# Patient Record
Sex: MALE | Race: WHITE | Employment: FULL TIME | URBAN - METROPOLITAN AREA
[De-identification: names, ages, dates, MRNs, and addresses within clinical notes are randomized per-mention and may not be internally consistent; named-entity substitution may affect disease eponyms.]

---

## 2017-02-23 DIAGNOSIS — F41.8 DEPRESSION WITH ANXIETY: ICD-10-CM

## 2017-02-27 RX ORDER — DESVENLAFAXINE SUCCINATE 50 MG/1
TABLET, EXTENDED RELEASE ORAL
Qty: 30 TABLET | Refills: 2 | Status: SHIPPED | OUTPATIENT
Start: 2017-02-27 | End: 2017-04-21 | Stop reason: SDUPTHER

## 2017-02-28 DIAGNOSIS — F41.8 DEPRESSION WITH ANXIETY: ICD-10-CM

## 2017-03-01 RX ORDER — DESVENLAFAXINE 50 MG/1
50 TABLET, EXTENDED RELEASE ORAL DAILY
Qty: 30 TABLET | Refills: 3 | Status: SHIPPED | OUTPATIENT
Start: 2017-03-01 | End: 2017-04-21

## 2017-04-21 ENCOUNTER — OFFICE VISIT (OUTPATIENT)
Dept: FAMILY MEDICINE CLINIC | Age: 47
End: 2017-04-21

## 2017-04-21 DIAGNOSIS — F41.8 DEPRESSION WITH ANXIETY: Primary | ICD-10-CM

## 2017-04-21 DIAGNOSIS — F41.0 PANIC ATTACK: ICD-10-CM

## 2017-04-21 DIAGNOSIS — M79.672 HEEL PAIN, CHRONIC, LEFT: ICD-10-CM

## 2017-04-21 DIAGNOSIS — I10 ESSENTIAL HYPERTENSION: ICD-10-CM

## 2017-04-21 DIAGNOSIS — N52.2 DRUG-INDUCED ERECTILE DYSFUNCTION: ICD-10-CM

## 2017-04-21 DIAGNOSIS — G89.29 HEEL PAIN, CHRONIC, LEFT: ICD-10-CM

## 2017-04-21 PROCEDURE — 99214 OFFICE O/P EST MOD 30 MIN: CPT | Performed by: FAMILY MEDICINE

## 2017-04-21 RX ORDER — DESVENLAFAXINE 50 MG/1
TABLET, EXTENDED RELEASE ORAL
Qty: 30 TABLET | Refills: 5 | Status: SHIPPED | OUTPATIENT
Start: 2017-04-21 | End: 2018-03-26 | Stop reason: DRUGHIGH

## 2017-04-21 RX ORDER — LISINOPRIL 10 MG/1
10 TABLET ORAL DAILY
Qty: 30 TABLET | Refills: 5 | Status: SHIPPED | OUTPATIENT
Start: 2017-04-21 | End: 2017-05-19 | Stop reason: SDUPTHER

## 2017-04-21 RX ORDER — LORAZEPAM 0.5 MG/1
0.5 TABLET ORAL EVERY 8 HOURS PRN
Qty: 30 TABLET | Refills: 0 | Status: CANCELLED | OUTPATIENT
Start: 2017-04-21

## 2017-04-22 VITALS
BODY MASS INDEX: 26.36 KG/M2 | WEIGHT: 199.8 LBS | TEMPERATURE: 97 F | SYSTOLIC BLOOD PRESSURE: 132 MMHG | HEART RATE: 80 BPM | DIASTOLIC BLOOD PRESSURE: 84 MMHG | RESPIRATION RATE: 18 BRPM

## 2017-05-19 DIAGNOSIS — I10 ESSENTIAL HYPERTENSION: ICD-10-CM

## 2017-05-19 RX ORDER — LISINOPRIL 10 MG/1
10 TABLET ORAL DAILY
Qty: 30 TABLET | Refills: 4 | Status: SHIPPED | OUTPATIENT
Start: 2017-05-19 | End: 2017-11-29 | Stop reason: SDUPTHER

## 2017-11-29 DIAGNOSIS — I10 ESSENTIAL HYPERTENSION: ICD-10-CM

## 2017-11-29 RX ORDER — LISINOPRIL 10 MG/1
10 TABLET ORAL DAILY
Qty: 30 TABLET | Refills: 2 | Status: SHIPPED | OUTPATIENT
Start: 2017-11-29 | End: 2018-04-30 | Stop reason: SDUPTHER

## 2017-12-07 ENCOUNTER — OFFICE VISIT (OUTPATIENT)
Dept: FAMILY MEDICINE CLINIC | Age: 47
End: 2017-12-07

## 2017-12-07 VITALS
RESPIRATION RATE: 20 BRPM | DIASTOLIC BLOOD PRESSURE: 82 MMHG | BODY MASS INDEX: 26.65 KG/M2 | OXYGEN SATURATION: 95 % | SYSTOLIC BLOOD PRESSURE: 130 MMHG | TEMPERATURE: 95.7 F | WEIGHT: 202 LBS | HEART RATE: 94 BPM

## 2017-12-07 DIAGNOSIS — I10 ESSENTIAL HYPERTENSION: ICD-10-CM

## 2017-12-07 DIAGNOSIS — R06.83 SNORING: ICD-10-CM

## 2017-12-07 DIAGNOSIS — N52.2 DRUG-INDUCED ERECTILE DYSFUNCTION: ICD-10-CM

## 2017-12-07 DIAGNOSIS — F41.0 PANIC ATTACK: ICD-10-CM

## 2017-12-07 DIAGNOSIS — F41.8 DEPRESSION WITH ANXIETY: Primary | ICD-10-CM

## 2017-12-07 LAB
A/G RATIO: 2 (ref 1.1–2.2)
ALBUMIN SERPL-MCNC: 4.9 G/DL (ref 3.4–5)
ALP BLD-CCNC: 68 U/L (ref 40–129)
ALT SERPL-CCNC: 32 U/L (ref 10–40)
ANION GAP SERPL CALCULATED.3IONS-SCNC: 12 MMOL/L (ref 3–16)
AST SERPL-CCNC: 26 U/L (ref 15–37)
BILIRUB SERPL-MCNC: 0.4 MG/DL (ref 0–1)
BUN BLDV-MCNC: 13 MG/DL (ref 7–20)
CALCIUM SERPL-MCNC: 9.4 MG/DL (ref 8.3–10.6)
CHLORIDE BLD-SCNC: 97 MMOL/L (ref 99–110)
CHOLESTEROL, TOTAL: 223 MG/DL (ref 0–199)
CO2: 28 MMOL/L (ref 21–32)
CREAT SERPL-MCNC: 0.9 MG/DL (ref 0.9–1.3)
GFR AFRICAN AMERICAN: >60
GFR NON-AFRICAN AMERICAN: >60
GLOBULIN: 2.5 G/DL
GLUCOSE BLD-MCNC: 99 MG/DL (ref 70–99)
HCT VFR BLD CALC: 45.1 % (ref 40.5–52.5)
HDLC SERPL-MCNC: 33 MG/DL (ref 40–60)
HEMOGLOBIN: 15.6 G/DL (ref 13.5–17.5)
LDL CHOLESTEROL CALCULATED: ABNORMAL MG/DL
MCH RBC QN AUTO: 32.9 PG (ref 26–34)
MCHC RBC AUTO-ENTMCNC: 34.5 G/DL (ref 31–36)
MCV RBC AUTO: 95.4 FL (ref 80–100)
PDW BLD-RTO: 13 % (ref 12.4–15.4)
PLATELET # BLD: 160 K/UL (ref 135–450)
PMV BLD AUTO: 11.5 FL (ref 5–10.5)
POTASSIUM SERPL-SCNC: 4.4 MMOL/L (ref 3.5–5.1)
RBC # BLD: 4.73 M/UL (ref 4.2–5.9)
SODIUM BLD-SCNC: 137 MMOL/L (ref 136–145)
TOTAL PROTEIN: 7.4 G/DL (ref 6.4–8.2)
TRIGL SERPL-MCNC: 458 MG/DL (ref 0–150)
VLDLC SERPL CALC-MCNC: ABNORMAL MG/DL
WBC # BLD: 6.2 K/UL (ref 4–11)

## 2017-12-07 PROCEDURE — G8419 CALC BMI OUT NRM PARAM NOF/U: HCPCS | Performed by: FAMILY MEDICINE

## 2017-12-07 PROCEDURE — 1036F TOBACCO NON-USER: CPT | Performed by: FAMILY MEDICINE

## 2017-12-07 PROCEDURE — 99214 OFFICE O/P EST MOD 30 MIN: CPT | Performed by: FAMILY MEDICINE

## 2017-12-07 PROCEDURE — G8427 DOCREV CUR MEDS BY ELIG CLIN: HCPCS | Performed by: FAMILY MEDICINE

## 2017-12-07 PROCEDURE — G8484 FLU IMMUNIZE NO ADMIN: HCPCS | Performed by: FAMILY MEDICINE

## 2017-12-07 RX ORDER — SILDENAFIL CITRATE 20 MG/1
20-40 TABLET ORAL DAILY PRN
Qty: 60 TABLET | Refills: 5 | Status: ON HOLD | OUTPATIENT
Start: 2017-12-07 | End: 2018-08-14 | Stop reason: HOSPADM

## 2017-12-07 RX ORDER — DESVENLAFAXINE 25 MG/1
25 TABLET, EXTENDED RELEASE ORAL DAILY
Qty: 30 TABLET | Refills: 5 | Status: SHIPPED | OUTPATIENT
Start: 2017-12-07 | End: 2018-07-19 | Stop reason: SDUPTHER

## 2017-12-07 RX ORDER — LORAZEPAM 0.5 MG/1
0.5 TABLET ORAL EVERY 8 HOURS PRN
Qty: 30 TABLET | Refills: 2 | Status: SHIPPED | OUTPATIENT
Start: 2017-12-07 | End: 2018-09-26 | Stop reason: SDUPTHER

## 2017-12-08 LAB
LDL CHOLESTEROL DIRECT: 126 MG/DL
TSH SERPL DL<=0.05 MIU/L-ACNC: 2.18 UIU/ML (ref 0.27–4.2)

## 2017-12-11 DIAGNOSIS — E78.1 HYPERTRIGLYCERIDEMIA: Primary | ICD-10-CM

## 2017-12-11 RX ORDER — FENOFIBRATE 160 MG/1
160 TABLET ORAL DAILY
Qty: 30 TABLET | Refills: 5 | Status: SHIPPED | OUTPATIENT
Start: 2017-12-11 | End: 2018-08-16

## 2018-03-26 ENCOUNTER — TELEPHONE (OUTPATIENT)
Dept: SLEEP MEDICINE | Age: 48
End: 2018-03-26

## 2018-03-26 ENCOUNTER — OFFICE VISIT (OUTPATIENT)
Dept: SLEEP MEDICINE | Age: 48
End: 2018-03-26

## 2018-03-26 VITALS
HEART RATE: 83 BPM | HEIGHT: 73 IN | BODY MASS INDEX: 26.64 KG/M2 | OXYGEN SATURATION: 96 % | WEIGHT: 201 LBS | DIASTOLIC BLOOD PRESSURE: 80 MMHG | SYSTOLIC BLOOD PRESSURE: 132 MMHG

## 2018-03-26 DIAGNOSIS — F41.8 DEPRESSION WITH ANXIETY: Chronic | ICD-10-CM

## 2018-03-26 DIAGNOSIS — I10 ESSENTIAL HYPERTENSION: Chronic | ICD-10-CM

## 2018-03-26 DIAGNOSIS — G47.10 HYPERSOMNIA: Primary | ICD-10-CM

## 2018-03-26 DIAGNOSIS — R06.83 SNORING: ICD-10-CM

## 2018-03-26 DIAGNOSIS — J30.89 CHRONIC ALLERGIC RHINITIS DUE TO OTHER ALLERGIC TRIGGER, UNSPECIFIED SEASONALITY: Chronic | ICD-10-CM

## 2018-03-26 PROBLEM — J31.0 CHRONIC RHINITIS: Status: ACTIVE | Noted: 2018-03-26

## 2018-03-26 PROCEDURE — G8427 DOCREV CUR MEDS BY ELIG CLIN: HCPCS | Performed by: INTERNAL MEDICINE

## 2018-03-26 PROCEDURE — 99244 OFF/OP CNSLTJ NEW/EST MOD 40: CPT | Performed by: INTERNAL MEDICINE

## 2018-03-26 PROCEDURE — G8484 FLU IMMUNIZE NO ADMIN: HCPCS | Performed by: INTERNAL MEDICINE

## 2018-03-26 PROCEDURE — G8419 CALC BMI OUT NRM PARAM NOF/U: HCPCS | Performed by: INTERNAL MEDICINE

## 2018-03-26 ASSESSMENT — SLEEP AND FATIGUE QUESTIONNAIRES
HOW LIKELY ARE YOU TO NOD OFF OR FALL ASLEEP WHEN YOU ARE A PASSENGER IN A CAR FOR AN HOUR WITHOUT A BREAK: 1
NECK CIRCUMFERENCE (INCHES): 15
HOW LIKELY ARE YOU TO NOD OFF OR FALL ASLEEP WHILE SITTING AND READING: 2
HOW LIKELY ARE YOU TO NOD OFF OR FALL ASLEEP WHILE SITTING QUIETLY AFTER LUNCH WITHOUT ALCOHOL: 1
ESS TOTAL SCORE: 7
HOW LIKELY ARE YOU TO NOD OFF OR FALL ASLEEP WHILE SITTING INACTIVE IN A PUBLIC PLACE: 0
HOW LIKELY ARE YOU TO NOD OFF OR FALL ASLEEP IN A CAR, WHILE STOPPED FOR A FEW MINUTES IN TRAFFIC: 0
HOW LIKELY ARE YOU TO NOD OFF OR FALL ASLEEP WHILE SITTING AND TALKING TO SOMEONE: 0
HOW LIKELY ARE YOU TO NOD OFF OR FALL ASLEEP WHILE LYING DOWN TO REST IN THE AFTERNOON WHEN CIRCUMSTANCES PERMIT: 2
HOW LIKELY ARE YOU TO NOD OFF OR FALL ASLEEP WHILE WATCHING TV: 1

## 2018-03-26 ASSESSMENT — ENCOUNTER SYMPTOMS
PHOTOPHOBIA: 0
VOMITING: 0
CHOKING: 0
EYE PAIN: 0
CHEST TIGHTNESS: 0
ABDOMINAL DISTENTION: 0
NAUSEA: 0
ALLERGIC/IMMUNOLOGIC NEGATIVE: 1
ABDOMINAL PAIN: 0
APNEA: 1
SHORTNESS OF BREATH: 0
RHINORRHEA: 0

## 2018-03-26 NOTE — LETTER
[facility]  [address]  [facility] [address]  Phone: [phone]  Fax: [phone]      March 26, 2018       Patient: Krzysztof Verdugo   MR Number: [id number]   YOB: 1970   Date of Visit: 3/26/2018     Thank you for allowing me to participate in the care of Krzysztof Verdugo. Here is my assessment and plan. Also attached is a copy of his consult note:    ASSESSMENT:  HARLEY was seen today for snoring and fatigue. Diagnoses and all orders for this visit:    Hypersomnia  Comments:  needs w/u  Orders:  -     Baseline Diagnostic Sleep Study; Future    Snoring  Comments:  needs w/u  Orders:  -     Baseline Diagnostic Sleep Study; Future    Essential hypertension    Depression with anxiety    Chronic allergic rhinitis due to other allergic trigger, unspecified seasonality        Plan:     Differential diagnosis includes but not limited to: BUSHRA, PLMD's, narcolepsy, parasomnias. Reviewed BUSHRA (which is the highest likelihood diagnosis): pathophysiology, diagnosis, complications and treatment. Instructed him not to drive if drowsy. Continue medications per his PCP and other physicians. Limit caffeine use after 3pm. Will do PSG to rule-out BUSHRA and other sleep disorders. 1 wk follow up after study to review his results. The chronic medical conditions listed are directly related to the primary diagnosis listed above. The management of the primary diagnosis affects the secondary diagnosis and vice versa. Cont meds for HTN, depression and rhinitis. Orders Placed This Encounter   Procedures    Baseline Diagnostic Sleep Study         If you have questions or concerns, please do not hesitate to call me. I look forward to following HARLEY along with you.     Sincerely,      Monique Domínguez MD    CC providers:  Chai Sweeney MD  [address] [address] [facility] In [facility] [address]

## 2018-04-30 DIAGNOSIS — I10 ESSENTIAL HYPERTENSION: ICD-10-CM

## 2018-04-30 RX ORDER — LISINOPRIL 10 MG/1
10 TABLET ORAL DAILY
Qty: 30 TABLET | Refills: 5 | Status: SHIPPED | OUTPATIENT
Start: 2018-04-30 | End: 2019-03-13 | Stop reason: SDUPTHER

## 2018-05-01 ENCOUNTER — HOSPITAL ENCOUNTER (OUTPATIENT)
Dept: SLEEP MEDICINE | Age: 48
Discharge: HOME | End: 2018-05-02
Attending: FAMILY MEDICINE | Admitting: FAMILY MEDICINE

## 2018-05-01 DIAGNOSIS — G47.10 HYPERSOMNIA: ICD-10-CM

## 2018-05-01 DIAGNOSIS — G47.33 OSA (OBSTRUCTIVE SLEEP APNEA): ICD-10-CM

## 2018-05-01 DIAGNOSIS — R06.83 SNORING: ICD-10-CM

## 2018-05-01 PROCEDURE — 95811 POLYSOM 6/>YRS CPAP 4/> PARM: CPT | Performed by: INTERNAL MEDICINE

## 2018-05-02 DIAGNOSIS — G47.33 OSA (OBSTRUCTIVE SLEEP APNEA): Primary | ICD-10-CM

## 2018-05-08 ENCOUNTER — TELEPHONE (OUTPATIENT)
Dept: SLEEP MEDICINE | Age: 48
End: 2018-05-08

## 2018-06-24 DIAGNOSIS — I10 ESSENTIAL HYPERTENSION: ICD-10-CM

## 2018-06-25 RX ORDER — LISINOPRIL 10 MG/1
10 TABLET ORAL DAILY
Qty: 30 TABLET | Refills: 0 | Status: SHIPPED | OUTPATIENT
Start: 2018-06-25 | End: 2018-07-19 | Stop reason: SDUPTHER

## 2018-07-16 DIAGNOSIS — I10 ESSENTIAL HYPERTENSION: ICD-10-CM

## 2018-07-16 RX ORDER — DESVENLAFAXINE 25 MG/1
25 TABLET, EXTENDED RELEASE ORAL DAILY
Qty: 30 TABLET | Refills: 5 | OUTPATIENT
Start: 2018-07-16

## 2018-07-16 RX ORDER — LISINOPRIL 10 MG/1
10 TABLET ORAL DAILY
Qty: 30 TABLET | Refills: 0 | OUTPATIENT
Start: 2018-07-16

## 2018-07-16 NOTE — TELEPHONE ENCOUNTER
From: Lucero Blair  Sent: 7/15/2018 7:37 PM EDT  Subject: Medication Renewal Request    Prosper Fitzgerald would like a refill of the following medications:     lisinopril (PRINIVIL;ZESTRIL) 10 MG tablet Vaishali Stevenson MD]    Preferred pharmacy: [facility] [address] [facility] [address] [facility] [phone] - F [phone]        Medication renewals requested in this message routed separately:     desvenlafaxine succinate (PRISTIQ) 25 MG TB24 extended release tablet Alysa Crum MD]

## 2018-07-17 ENCOUNTER — OFFICE VISIT (OUTPATIENT)
Dept: SLEEP MEDICINE | Age: 48
End: 2018-07-17

## 2018-07-17 VITALS
OXYGEN SATURATION: 97 % | WEIGHT: 203 LBS | SYSTOLIC BLOOD PRESSURE: 118 MMHG | BODY MASS INDEX: 26.9 KG/M2 | DIASTOLIC BLOOD PRESSURE: 87 MMHG | HEART RATE: 77 BPM | HEIGHT: 73 IN

## 2018-07-17 DIAGNOSIS — I10 ESSENTIAL HYPERTENSION: Chronic | ICD-10-CM

## 2018-07-17 DIAGNOSIS — F41.8 DEPRESSION WITH ANXIETY: Chronic | ICD-10-CM

## 2018-07-17 DIAGNOSIS — J30.89 CHRONIC ALLERGIC RHINITIS DUE TO OTHER ALLERGIC TRIGGER, UNSPECIFIED SEASONALITY: Chronic | ICD-10-CM

## 2018-07-17 DIAGNOSIS — G47.33 OBSTRUCTIVE SLEEP APNEA SYNDROME: ICD-10-CM

## 2018-07-17 PROBLEM — J31.0 CHRONIC RHINITIS: Chronic | Status: ACTIVE | Noted: 2018-03-26

## 2018-07-17 PROCEDURE — 99214 OFFICE O/P EST MOD 30 MIN: CPT | Performed by: INTERNAL MEDICINE

## 2018-07-17 PROCEDURE — 1036F TOBACCO NON-USER: CPT | Performed by: INTERNAL MEDICINE

## 2018-07-17 PROCEDURE — G8419 CALC BMI OUT NRM PARAM NOF/U: HCPCS | Performed by: INTERNAL MEDICINE

## 2018-07-17 PROCEDURE — G8427 DOCREV CUR MEDS BY ELIG CLIN: HCPCS | Performed by: INTERNAL MEDICINE

## 2018-07-17 ASSESSMENT — SLEEP AND FATIGUE QUESTIONNAIRES
HOW LIKELY ARE YOU TO NOD OFF OR FALL ASLEEP WHILE SITTING AND TALKING TO SOMEONE: 0
HOW LIKELY ARE YOU TO NOD OFF OR FALL ASLEEP IN A CAR, WHILE STOPPED FOR A FEW MINUTES IN TRAFFIC: 0
HOW LIKELY ARE YOU TO NOD OFF OR FALL ASLEEP WHILE SITTING QUIETLY AFTER LUNCH WITHOUT ALCOHOL: 0
ESS TOTAL SCORE: 4
HOW LIKELY ARE YOU TO NOD OFF OR FALL ASLEEP WHILE WATCHING TV: 1
HOW LIKELY ARE YOU TO NOD OFF OR FALL ASLEEP WHILE SITTING INACTIVE IN A PUBLIC PLACE: 0
HOW LIKELY ARE YOU TO NOD OFF OR FALL ASLEEP WHILE SITTING AND READING: 0
HOW LIKELY ARE YOU TO NOD OFF OR FALL ASLEEP WHEN YOU ARE A PASSENGER IN A CAR FOR AN HOUR WITHOUT A BREAK: 1
HOW LIKELY ARE YOU TO NOD OFF OR FALL ASLEEP WHILE LYING DOWN TO REST IN THE AFTERNOON WHEN CIRCUMSTANCES PERMIT: 2

## 2018-07-17 ASSESSMENT — ENCOUNTER SYMPTOMS
CHEST TIGHTNESS: 0
SINUS PRESSURE: 0
PHOTOPHOBIA: 0
EYE PAIN: 0
STRIDOR: 0
SHORTNESS OF BREATH: 0

## 2018-07-17 NOTE — PROGRESS NOTES
LORazepam (ATIVAN) 0.5 MG tablet Take 1 tablet by mouth every 8 hours as needed for Anxiety . 30 tablet 2    desvenlafaxine succinate (PRISTIQ) 25 MG TB24 extended release tablet Take 1 tablet by mouth daily 30 tablet 5    sildenafil (REVATIO) 20 MG tablet Take 1-2 tablets by mouth daily as needed (erectile dysfunction) 60 tablet 5    fluticasone (FLONASE) 50 MCG/ACT nasal spray 1 spray by Nasal route daily. 1 Bottle 5     No current facility-administered medications for this visit. Allergies as of 07/17/2018 - Review Complete 07/17/2018   Allergen Reaction Noted    No known drug allergy  02/24/2011       Patient Active Problem List   Diagnosis    Umbilical hernia    Depression with anxiety    Essential hypertension    Chronic rhinitis    Obstructive sleep apnea syndrome       Past Medical History:   Diagnosis Date    Chronic rhinitis 3/26/2018    Depression with anxiety 10/3/2014    Essential hypertension 9/23/2016    Obstructive sleep apnea syndrome     Umbilical hernia        Past Surgical History:   Procedure Laterality Date    INGUINAL HERNIA REPAIR      UMBILICAL HERNIA REPAIR         Family History   Problem Relation Age of Onset    Hypertension Other        Review of Systems   Constitutional: Negative. HENT: Negative for dental problem, ear pain, nosebleeds, sinus pressure and sneezing. Eyes: Negative for photophobia, pain and visual disturbance. Respiratory: Negative for chest tightness, shortness of breath and stridor. Cardiovascular: Negative for chest pain, palpitations and leg swelling. Musculoskeletal: Negative for neck pain and neck stiffness. Vitals:  Weight BMI   Wt Readings from Last 3 Encounters:   07/17/18 203 lb (92.1 kg)   03/26/18 201 lb (91.2 kg)   12/07/17 202 lb (91.6 kg)    Body mass index is 26.78 kg/m².      BP HR SaO2   BP Readings from Last 3 Encounters:   07/17/18 118/87   03/26/18 132/80   12/07/17 130/82    Pulse Readings from Last 3

## 2018-07-17 NOTE — LETTER
[facility]  [address] [facility] [address]  Phone: [phone]  Fax: [phone]    July 17, 2018       Patient: Chaya Madrid   MR Number: [id number]   YOB: 1970   Date of Visit: 7/17/2018       Chaya Madrid was seen for a follow up visit today. Here is my assessment and plan as well as an attached copy of his visit today:    Obstructive sleep apnea syndrome  New Problem - On Tx. Reviewed sleep study (copy given to pt for their records) and download compliance data with patient. Supplies and parts as needed for his machine. These are medically necessary. Limit caffeine use after 3pm.  Discussed CPAP pillows and possible mask liners. Essential hypertension  Chronic- Stable. Cont meds per PCP and other physicians. Depression with anxiety  Chronic- Stable. Cont meds per PCP and other physicians. Chronic rhinitis  Chronic- Stable. Cont meds per PCP and other physicians. If you have questions or concerns, please do not hesitate to call me. I look forward to following Jesica Jeter along with you.     Sincerely,    Aspen Valadez MD    CC providers:  Mariluz Maria MD  [address] [place] [id number] [address] [place] [address] In [place]

## 2018-07-17 NOTE — ASSESSMENT & PLAN NOTE
New Problem - On Tx. Reviewed sleep study (copy given to pt for their records) and download compliance data with patient. Supplies and parts as needed for his machine. These are medically necessary. Limit caffeine use after 3pm.  Discussed CPAP pillows and possible mask liners.

## 2018-07-19 DIAGNOSIS — I10 ESSENTIAL HYPERTENSION: ICD-10-CM

## 2018-07-19 RX ORDER — LISINOPRIL 10 MG/1
10 TABLET ORAL DAILY
Qty: 30 TABLET | Refills: 5 | Status: SHIPPED | OUTPATIENT
Start: 2018-07-19 | End: 2019-02-05 | Stop reason: SDUPTHER

## 2018-07-19 RX ORDER — DESVENLAFAXINE 25 MG/1
25 TABLET, EXTENDED RELEASE ORAL DAILY
Qty: 30 TABLET | Refills: 5 | Status: SHIPPED | OUTPATIENT
Start: 2018-07-19 | End: 2019-01-28 | Stop reason: SDUPTHER

## 2018-07-19 NOTE — TELEPHONE ENCOUNTER
Last OV 12/07/2017  No f/u appointment scheduled at this time. Lisinopril was sent to Pharmacy on 4/30/18 #30 with 5 refills, and again on 6/25/18 #30 with 0 refills. Pristiq 25 mg sent on 12/7/17 # 30 with 5 refills. New rx due at this time. Both Rx pending to requested pharmacy. Please advise.

## 2018-08-10 ENCOUNTER — OFFICE VISIT (OUTPATIENT)
Dept: FAMILY MEDICINE CLINIC | Age: 48
End: 2018-08-10

## 2018-08-10 ENCOUNTER — TELEPHONE (OUTPATIENT)
Dept: FAMILY MEDICINE CLINIC | Age: 48
End: 2018-08-10

## 2018-08-10 VITALS
HEART RATE: 82 BPM | RESPIRATION RATE: 12 BRPM | TEMPERATURE: 97.1 F | WEIGHT: 205 LBS | OXYGEN SATURATION: 97 % | BODY MASS INDEX: 27.05 KG/M2 | DIASTOLIC BLOOD PRESSURE: 85 MMHG | SYSTOLIC BLOOD PRESSURE: 133 MMHG

## 2018-08-10 DIAGNOSIS — R35.0 URINARY FREQUENCY: ICD-10-CM

## 2018-08-10 DIAGNOSIS — N41.0 ACUTE PROSTATITIS: ICD-10-CM

## 2018-08-10 DIAGNOSIS — R07.89 CHEST PRESSURE: Primary | ICD-10-CM

## 2018-08-10 DIAGNOSIS — I10 ESSENTIAL HYPERTENSION: Chronic | ICD-10-CM

## 2018-08-10 DIAGNOSIS — E78.1 HYPERTRIGLYCERIDEMIA: ICD-10-CM

## 2018-08-10 LAB
BILIRUBIN, POC: NEGATIVE
BLOOD URINE, POC: 6.5
CLARITY, POC: CLEAR
COLOR, POC: YELLOW
GLUCOSE URINE, POC: NEGATIVE
KETONES, POC: NEGATIVE
LEUKOCYTE EST, POC: NEGATIVE
NITRITE, POC: NEGATIVE
PH, POC: NEGATIVE
PROTEIN, POC: NEGATIVE
SPECIFIC GRAVITY, POC: 1010
UROBILINOGEN, POC: 0.2

## 2018-08-10 PROCEDURE — 81002 URINALYSIS NONAUTO W/O SCOPE: CPT | Performed by: FAMILY MEDICINE

## 2018-08-10 PROCEDURE — G8419 CALC BMI OUT NRM PARAM NOF/U: HCPCS | Performed by: FAMILY MEDICINE

## 2018-08-10 PROCEDURE — 1036F TOBACCO NON-USER: CPT | Performed by: FAMILY MEDICINE

## 2018-08-10 PROCEDURE — 93000 ELECTROCARDIOGRAM COMPLETE: CPT | Performed by: FAMILY MEDICINE

## 2018-08-10 PROCEDURE — 99214 OFFICE O/P EST MOD 30 MIN: CPT | Performed by: FAMILY MEDICINE

## 2018-08-10 PROCEDURE — G8427 DOCREV CUR MEDS BY ELIG CLIN: HCPCS | Performed by: FAMILY MEDICINE

## 2018-08-10 RX ORDER — CIPROFLOXACIN 500 MG/1
500 TABLET, FILM COATED ORAL 2 TIMES DAILY
Qty: 20 TABLET | Refills: 0 | Status: ON HOLD | OUTPATIENT
Start: 2018-08-10 | End: 2018-08-14 | Stop reason: HOSPADM

## 2018-08-10 ASSESSMENT — PATIENT HEALTH QUESTIONNAIRE - PHQ9
2. FEELING DOWN, DEPRESSED OR HOPELESS: 1
1. LITTLE INTEREST OR PLEASURE IN DOING THINGS: 0
SUM OF ALL RESPONSES TO PHQ9 QUESTIONS 1 & 2: 1
SUM OF ALL RESPONSES TO PHQ QUESTIONS 1-9: 1
SUM OF ALL RESPONSES TO PHQ QUESTIONS 1-9: 1

## 2018-08-10 NOTE — PROGRESS NOTES
Pt states that he was walking to a concert and felt some cramping, chest pressure issues. Pt took a deep breath and sx did improve. About 2 wks ago, while cutting the grass, he felt similar sx. Occurred again with cutting grass. Pt is finding that with any physical activity, gets a cramping/indigestion and does feel that he gets sx referred into neck and back of throat. Sx only with exertion, never at rest and present with exertion only. Pt does have HTN, and does have some family hx of coronary artery disease. Pt states it feels like a pressure, that is present only when he is exerting himself. Pt not doing any formal exercise but has noted this with exertion. No syncope. No dizziness during episode. No shortness of breath at rest or with exertion. Pt has noted some issues with urinary sx, has noted that stream is not as strong as prior, and has been present for 2wks or so. Sx come and go. Does have some dribbling. Does have very mild hesitancy. No blood in urine, no burning but feels some sort of discomfort in the area. Sx present for about 2wks currently. No abd pain, no nausea/vomiting, no fever, chills. Pt here for follow up of blood pressure. Pt states doing great with adherence to therapy and feels well. No issues of chest pain, shortness of breath. No vision changes, headache, swelling in legs. Except as noted above in the history of present illness, the review of systems is  negative for headache, vision changes, abd pain, bowel changes. Past medical, surgical, and social history reviewed and updated  Medications and allergies reviewed and updated         O: /85   Pulse 82   Temp 97.1 °F (36.2 °C)   Resp 12   Wt 205 lb (93 kg)   SpO2 97%   BMI 27.05 kg/m²   GEN: No acute distress, cooperative, well nourished, alert. HEENT: PEERLA, EOMI , normocephalic/atraumatic, nares and oropharynx clear. Mucus membranes normal, Tympanic membranes clear bilaterally. today  Management pending results. To ER for increasing chest pain, angina sx as discussed  - EKG 12 Lead  - Stress test, myoview    2. Hypertriglyceridemia  Tolerating therapy, due for fu/ bloodwork but will hold off pending eval of chest pain     3. Essential hypertension  Stable @ goal, controlled  - Stress test, myoview    4. Urinary frequency  UA neg, exam and hx c/w acute prostatitis  tx as below  - POCT Urinalysis no Micro    5. Acute prostatitis  - ciprofloxacin (CIPRO) 500 MG tablet; Take 1 tablet by mouth 2 times daily for 10 days  Dispense: 20 tablet; Refill: 0           Follow-up appointment:   Pending stress test results    Discussed use, benefit, and side effects of all prescribed medications. Barriers to medication compliance addressed. All patient questions answered. Pt voiced understanding. When applicable, [facility]. The patient has signed appropriate paperworks/consents. Dragon dictation software was used for parts of this progress note. All attempts were made to correct any errors and ensure accuracy.

## 2018-08-11 ENCOUNTER — APPOINTMENT (OUTPATIENT)
Dept: GENERAL RADIOLOGY | Age: 48
DRG: 247 | End: 2018-08-11
Payer: COMMERCIAL

## 2018-08-11 ENCOUNTER — HOSPITAL ENCOUNTER (INPATIENT)
Age: 48
LOS: 1 days | Discharge: HOME | DRG: 247 | End: 2018-08-14
Attending: EMERGENCY MEDICINE | Admitting: INTERNAL MEDICINE
Payer: COMMERCIAL

## 2018-08-11 ENCOUNTER — HOSPITAL ENCOUNTER (OUTPATIENT)
Dept: NUCLEAR MEDICINE | Age: 48
Discharge: HOME | DRG: 247 | End: 2018-08-11
Payer: COMMERCIAL

## 2018-08-11 DIAGNOSIS — R94.39 POSITIVE CARDIAC STRESS TEST: ICD-10-CM

## 2018-08-11 DIAGNOSIS — R07.9 CHEST PAIN, UNSPECIFIED TYPE: Primary | ICD-10-CM

## 2018-08-11 DIAGNOSIS — R07.89 CHEST PRESSURE: ICD-10-CM

## 2018-08-11 DIAGNOSIS — I10 ESSENTIAL HYPERTENSION: ICD-10-CM

## 2018-08-11 PROBLEM — I20.8 ANGINA OF EFFORT (HCC): Status: ACTIVE | Noted: 2018-08-11

## 2018-08-11 PROBLEM — I20.89 ANGINA OF EFFORT: Status: ACTIVE | Noted: 2018-08-11

## 2018-08-11 PROBLEM — E78.1 HYPERTRIGLYCERIDEMIA: Chronic | Status: ACTIVE | Noted: 2018-08-10

## 2018-08-11 LAB
ANION GAP SERPL CALCULATED.3IONS-SCNC: 14 MMOL/L (ref 3–16)
BASOPHILS ABSOLUTE: 0.1 K/UL (ref 0–0.2)
BASOPHILS RELATIVE PERCENT: 0.7 %
BUN BLDV-MCNC: 14 MG/DL (ref 7–20)
CALCIUM SERPL-MCNC: 9.7 MG/DL (ref 8.3–10.6)
CHLORIDE BLD-SCNC: 101 MMOL/L (ref 99–110)
CO2: 22 MMOL/L (ref 21–32)
CREAT SERPL-MCNC: 0.9 MG/DL (ref 0.9–1.3)
EKG ATRIAL RATE: 102 BPM
EKG DIAGNOSIS: NORMAL
EKG P AXIS: 51 DEGREES
EKG P-R INTERVAL: 150 MS
EKG Q-T INTERVAL: 326 MS
EKG QRS DURATION: 88 MS
EKG QTC CALCULATION (BAZETT): 424 MS
EKG R AXIS: 37 DEGREES
EKG T AXIS: 43 DEGREES
EKG VENTRICULAR RATE: 102 BPM
EOSINOPHILS ABSOLUTE: 0 K/UL (ref 0–0.6)
EOSINOPHILS RELATIVE PERCENT: 0.5 %
GFR AFRICAN AMERICAN: >60
GFR NON-AFRICAN AMERICAN: >60
GLUCOSE BLD-MCNC: 98 MG/DL (ref 70–99)
HCT VFR BLD CALC: 46.6 % (ref 40.5–52.5)
HEMOGLOBIN: 16.2 G/DL (ref 13.5–17.5)
LV EF: 55 %
LV EF: 67 %
LVEF MODALITY: NORMAL
LVEF MODALITY: NORMAL
LYMPHOCYTES ABSOLUTE: 1.5 K/UL (ref 1–5.1)
LYMPHOCYTES RELATIVE PERCENT: 14.5 %
MCH RBC QN AUTO: 32.2 PG (ref 26–34)
MCHC RBC AUTO-ENTMCNC: 34.7 G/DL (ref 31–36)
MCV RBC AUTO: 92.8 FL (ref 80–100)
MONOCYTES ABSOLUTE: 0.6 K/UL (ref 0–1.3)
MONOCYTES RELATIVE PERCENT: 6.4 %
NEUTROPHILS ABSOLUTE: 7.8 K/UL (ref 1.7–7.7)
NEUTROPHILS RELATIVE PERCENT: 77.9 %
PDW BLD-RTO: 12.8 % (ref 12.4–15.4)
PLATELET # BLD: 148 K/UL (ref 135–450)
PMV BLD AUTO: 10.8 FL (ref 5–10.5)
POTASSIUM SERPL-SCNC: 4.6 MMOL/L (ref 3.5–5.1)
RBC # BLD: 5.02 M/UL (ref 4.2–5.9)
SODIUM BLD-SCNC: 137 MMOL/L (ref 136–145)
TROPONIN: 0.01 NG/ML
TROPONIN: <0.01 NG/ML
TROPONIN: <0.01 NG/ML
WBC # BLD: 10.1 K/UL (ref 4–11)

## 2018-08-11 PROCEDURE — 2580000003 HC RX 258: Performed by: INTERNAL MEDICINE

## 2018-08-11 PROCEDURE — 93017 CV STRESS TEST TRACING ONLY: CPT

## 2018-08-11 PROCEDURE — 3430000000 HC RX DIAGNOSTIC RADIOPHARMACEUTICAL: Performed by: FAMILY MEDICINE

## 2018-08-11 PROCEDURE — 6370000000 HC RX 637 (ALT 250 FOR IP): Performed by: INTERNAL MEDICINE

## 2018-08-11 PROCEDURE — 6370000000 HC RX 637 (ALT 250 FOR IP): Performed by: EMERGENCY MEDICINE

## 2018-08-11 PROCEDURE — 80048 BASIC METABOLIC PNL TOTAL CA: CPT

## 2018-08-11 PROCEDURE — 93306 TTE W/DOPPLER COMPLETE: CPT

## 2018-08-11 PROCEDURE — 93005 ELECTROCARDIOGRAM TRACING: CPT | Performed by: EMERGENCY MEDICINE

## 2018-08-11 PROCEDURE — 78452 HT MUSCLE IMAGE SPECT MULT: CPT

## 2018-08-11 PROCEDURE — G0378 HOSPITAL OBSERVATION PER HR: HCPCS

## 2018-08-11 PROCEDURE — 84484 ASSAY OF TROPONIN QUANT: CPT

## 2018-08-11 PROCEDURE — 99222 1ST HOSP IP/OBS MODERATE 55: CPT | Performed by: INTERNAL MEDICINE

## 2018-08-11 PROCEDURE — A9502 TC99M TETROFOSMIN: HCPCS | Performed by: FAMILY MEDICINE

## 2018-08-11 PROCEDURE — 2580000003 HC RX 258: Performed by: FAMILY MEDICINE

## 2018-08-11 PROCEDURE — 99284 EMERGENCY DEPT VISIT MOD MDM: CPT

## 2018-08-11 PROCEDURE — 85025 COMPLETE CBC W/AUTO DIFF WBC: CPT

## 2018-08-11 PROCEDURE — 71046 X-RAY EXAM CHEST 2 VIEWS: CPT

## 2018-08-11 PROCEDURE — 36415 COLL VENOUS BLD VENIPUNCTURE: CPT

## 2018-08-11 RX ORDER — ACETAMINOPHEN 325 MG/1
650 TABLET ORAL EVERY 4 HOURS PRN
Status: DISCONTINUED | OUTPATIENT
Start: 2018-08-11 | End: 2018-08-13 | Stop reason: SDUPTHER

## 2018-08-11 RX ORDER — DESVENLAFAXINE 25 MG/1
25 TABLET, EXTENDED RELEASE ORAL DAILY
Status: DISCONTINUED | OUTPATIENT
Start: 2018-08-11 | End: 2018-08-14 | Stop reason: HOSPADM

## 2018-08-11 RX ORDER — LISINOPRIL 10 MG/1
10 TABLET ORAL DAILY
Status: DISCONTINUED | OUTPATIENT
Start: 2018-08-11 | End: 2018-08-14 | Stop reason: HOSPADM

## 2018-08-11 RX ORDER — ASPIRIN 81 MG/1
81 TABLET, CHEWABLE ORAL DAILY
Status: DISCONTINUED | OUTPATIENT
Start: 2018-08-12 | End: 2018-08-14 | Stop reason: HOSPADM

## 2018-08-11 RX ORDER — NITROGLYCERIN 0.4 MG/1
0.4 TABLET SUBLINGUAL EVERY 5 MIN PRN
Status: DISCONTINUED | OUTPATIENT
Start: 2018-08-11 | End: 2018-08-14 | Stop reason: HOSPADM

## 2018-08-11 RX ORDER — 0.9 % SODIUM CHLORIDE 0.9 %
10 VIAL (ML) INJECTION PRN
Status: DISCONTINUED | OUTPATIENT
Start: 2018-08-11 | End: 2018-08-12 | Stop reason: HOSPADM

## 2018-08-11 RX ORDER — SODIUM CHLORIDE 0.9 % (FLUSH) 0.9 %
10 SYRINGE (ML) INJECTION EVERY 12 HOURS SCHEDULED
Status: DISCONTINUED | OUTPATIENT
Start: 2018-08-11 | End: 2018-08-14 | Stop reason: HOSPADM

## 2018-08-11 RX ORDER — ONDANSETRON 2 MG/ML
4 INJECTION INTRAMUSCULAR; INTRAVENOUS EVERY 6 HOURS PRN
Status: DISCONTINUED | OUTPATIENT
Start: 2018-08-11 | End: 2018-08-14 | Stop reason: HOSPADM

## 2018-08-11 RX ORDER — SODIUM CHLORIDE 0.9 % (FLUSH) 0.9 %
10 SYRINGE (ML) INJECTION PRN
Status: DISCONTINUED | OUTPATIENT
Start: 2018-08-11 | End: 2018-08-14 | Stop reason: HOSPADM

## 2018-08-11 RX ORDER — LORAZEPAM 0.5 MG/1
0.5 TABLET ORAL EVERY 8 HOURS PRN
Status: DISCONTINUED | OUTPATIENT
Start: 2018-08-11 | End: 2018-08-13

## 2018-08-11 RX ORDER — LISINOPRIL 10 MG/1
TABLET ORAL
Status: DISPENSED
Start: 2018-08-11 | End: 2018-08-12

## 2018-08-11 RX ORDER — CARVEDILOL 6.25 MG/1
3.12 TABLET ORAL 2 TIMES DAILY WITH MEALS
Status: DISCONTINUED | OUTPATIENT
Start: 2018-08-11 | End: 2018-08-14 | Stop reason: HOSPADM

## 2018-08-11 RX ORDER — ASPIRIN 81 MG/1
324 TABLET, CHEWABLE ORAL ONCE
Status: COMPLETED | OUTPATIENT
Start: 2018-08-11 | End: 2018-08-11

## 2018-08-11 RX ADMIN — LISINOPRIL 10 MG: 10 TABLET ORAL at 13:53

## 2018-08-11 RX ADMIN — SODIUM CHLORIDE 10 ML: 9 INJECTION, SOLUTION INTRAMUSCULAR; INTRAVENOUS; SUBCUTANEOUS at 09:56

## 2018-08-11 RX ADMIN — CARVEDILOL 3.12 MG: 6.25 TABLET, FILM COATED ORAL at 18:04

## 2018-08-11 RX ADMIN — DESVENLAFAXINE 25 MG: 25 TABLET, EXTENDED RELEASE ORAL at 18:05

## 2018-08-11 RX ADMIN — ASPIRIN 81 MG 324 MG: 81 TABLET ORAL at 13:13

## 2018-08-11 RX ADMIN — TETROFOSMIN 30 MILLICURIE: 1.38 INJECTION, POWDER, LYOPHILIZED, FOR SOLUTION INTRAVENOUS at 09:56

## 2018-08-11 RX ADMIN — ACETAMINOPHEN 650 MG: 325 TABLET ORAL at 18:07

## 2018-08-11 RX ADMIN — TETROFOSMIN 10 MILLICURIE: 1.38 INJECTION, POWDER, LYOPHILIZED, FOR SOLUTION INTRAVENOUS at 08:56

## 2018-08-11 RX ADMIN — SODIUM CHLORIDE 10 ML: 9 INJECTION, SOLUTION INTRAMUSCULAR; INTRAVENOUS; SUBCUTANEOUS at 08:56

## 2018-08-11 RX ADMIN — Medication 10 ML: at 22:16

## 2018-08-11 RX ADMIN — ACETAMINOPHEN 650 MG: 325 TABLET ORAL at 22:16

## 2018-08-11 ASSESSMENT — PAIN SCALES - GENERAL
PAINLEVEL_OUTOF10: 4
PAINLEVEL_OUTOF10: 0
PAINLEVEL_OUTOF10: 1
PAINLEVEL_OUTOF10: 0
PAINLEVEL_OUTOF10: 0
PAINLEVEL_OUTOF10: 6
PAINLEVEL_OUTOF10: 4

## 2018-08-11 ASSESSMENT — PAIN DESCRIPTION - DESCRIPTORS
DESCRIPTORS: HEADACHE
DESCRIPTORS: HEADACHE

## 2018-08-11 ASSESSMENT — PAIN DESCRIPTION - LOCATION
LOCATION: HEAD
LOCATION: HEAD
LOCATION: CHEST;JAW

## 2018-08-11 ASSESSMENT — PAIN DESCRIPTION - ORIENTATION: ORIENTATION: UPPER

## 2018-08-11 ASSESSMENT — PAIN DESCRIPTION - PAIN TYPE
TYPE: ACUTE PAIN

## 2018-08-11 ASSESSMENT — PAIN DESCRIPTION - FREQUENCY
FREQUENCY: INTERMITTENT
FREQUENCY: INTERMITTENT

## 2018-08-11 ASSESSMENT — ENCOUNTER SYMPTOMS
SHORTNESS OF BREATH: 0
BACK PAIN: 0
ABDOMINAL PAIN: 0

## 2018-08-11 ASSESSMENT — PAIN DESCRIPTION - ONSET
ONSET: ON-GOING
ONSET: ON-GOING

## 2018-08-11 ASSESSMENT — HEART SCORE: ECG: 1

## 2018-08-11 NOTE — CONSULTS
[facility] Cardiology   CONSULTATION  [phone]                                                                                            Pt Name: Estiven Garibay  Age: 53 y.o. Sex: male  : 1970  Location: [place] [address]    Referring Physician: Cailin Morris MD      Reason for Consult:       Reason for Consultation/Chief Complaint: Angina/+ Stress test      HPI:      Estiven Garibay is a 53 y.o. male with a past medical history of HTN, HLD  who presented to the hospital for outpatient stress test. Results are positive. Currently pt is asymptomatic but was complaining of progressive angina over the past few weeks. He denies irregular heart beat, lower extremity edema, near-syncope, orthopnea, palpitations, paroxysmal nocturnal dyspnea and syncope. Histories     Past Medical History:   has a past medical history of Chronic rhinitis; Depression with anxiety; Essential hypertension; Hypertriglyceridemia; Obstructive sleep apnea syndrome; and Umbilical hernia. Surgical History:   has a past surgical history that includes Inguinal hernia repair and Umbilical hernia repair. Social History:   reports that he has never smoked. He has never used smokeless tobacco. He reports that he drinks alcohol. Family History:  No evidence for sudden cardiac death or premature CAD      Medications:       Home Medications  Were reviewed and are listed in nursing record. and/or listed below  Prior to Admission medications    Medication Sig Start Date End Date Taking?  Authorizing Provider   ciprofloxacin (CIPRO) 500 MG tablet Take 1 tablet by mouth 2 times daily for 10 days 8/10/18 8/20/18 Yes Susanne Macario MD   desvenlafaxine succinate (PRISTIQ) 25 MG TB24 extended release tablet Take 1 tablet by mouth daily 18  Yes Susanne Macario MD   lisinopril (PRINIVIL;ZESTRIL) 10 MG tablet Take 1 tablet by mouth daily 18  Yes Susanne Macario MD   fenofibrate 160 MG tablet Take 1 tablet by mouth daily 17 Yes Crescencio Hinds MD   LORazepam (ATIVAN) 0.5 MG tablet Take 1 tablet by mouth every 8 hours as needed for Anxiety . 12/7/17  Yes Crescencio Hinds MD   sildenafil (REVATIO) 20 MG tablet Take 1-2 tablets by mouth daily as needed (erectile dysfunction) 12/7/17  Yes Crescencio Hinds MD   fluticasone (FLONASE) 50 MCG/ACT nasal spray 1 spray by Nasal route daily. 3/6/15 3/5/16  Crescencio Hinds MD          Inpatient Medications:      IV drips:      PRN:      Allergy:     No known drug allergy       Review of Systems:     All 12 point review of symptoms completed. Pertinent positives identified in the HPI, all other review of symptoms negative as below. CONSTITUTIONAL: Nounanticipated weight loss. No change in energy level, sleep pattern, or activity level. SKIN: No rash or pruritis. EYES: No visual changes or diplopia. No scleral icterus. ENT: No Headaches, hearing loss or vertigo. No mouth sores or sore throat. CARDIOVASCULAR: + chest pain/chest pressure/chest discomfort. No palpitations. RESPIRATORY: No cough or wheezing, no sputum production. No hematemesis. GASTROINTESTINAL: No N/V/D. No abdominal pain, appetite loss, blood in stools. GENITOURINARY: No dysuria, trouble voiding, or hematuria. MUSCULOSKELETAL:  No gait disturbance, weakness or joint complaints. NEUROLOGICAL: No headache, diplopia, change in muscle strength, numbness or tingling. No change in gait, balance, coordination, mood, affect, memory, mentation, behavior. PSHYCH: No anxiety, loss of interest, change in sexual behavior, feelings of self-harm, or confusion. ENDOCRINE: No malaise, fatigue or temperature intolerance. No excessive thirst, fluid intake, or urination. No tremor. HEMATOLOGIC: No abnormal bruising or bleeding. ALLERGY: No nasal congestion or hives.       Physical Examination:     Vitals:    08/11/18 1137   BP: (!) 160/106   Pulse: 82   Resp: 16   Temp: 98.3 °F (36.8 °C)   TempSrc: Oral   SpO2: 100%   Weight: 205 lb (93 kg)   Height: 6' 1\" (1.854 m)       Wt Readings from Last 3 Encounters:   08/11/18 205 lb (93 kg)   08/10/18 205 lb (93 kg)   07/17/18 203 lb (92.1 kg)       Objective      General Appearance:  Alert, cooperative, no distress, appears stated age Appropriate weight   Head:  Normocephalic, without obvious abnormality, atraumatic   Eyes:  PERRL, conjunctiva/corneas clear EOM intact  Ears normal   Throat no lesions       Nose: Nares normal, no drainage or sinus tenderness   Throat: Lips, mucosa, and tongue normal   Neck: Supple, symmetrical, trachea midline, no adenopathy, thyroid: not enlarged, symmetric, no tenderness/mass/nodules, no carotid bruit or JVD       Lungs:   Clear to auscultation bilaterally, respirations unlabored   Chest Wall:  No tenderness or deformity   Heart:  Regular rate and rhythm, S1, S2 normal, no murmur, rub or gallop PMI intact   Abdomen:   Soft, non-tender, bowel sounds active all four quadrants,  no masses, no organomegaly       Extremities: Extremities normal, atraumatic, no cyanosis or edema   Pulses: 2+ and symmetric   Skin: Skin color, texture, turgor normal, no rashes or lesions   Pysch: Normal mood and affect   Neurologic: Normal gross motor and sensory exam.  Cranial nerves intact        Labs:     No results for input(s): NA, K, BUN, CREATININE, CL, CO2, GLUCOSE, CALCIUM, MG in the last 72 hours. No results for input(s): WBC, HGB, HCT, PLT, MCV in the last 72 hours. No results for input(s): CHOLTOT, TRIG, HDL in the last 72 hours. Invalid input(s): LIPIDCOMM, CHOLHDL, VLDCHOL, LDL  No results for input(s): PTT, INR in the last 72 hours. Invalid input(s): PT  No results for input(s): CKTOTAL, CKMB, CKMBINDEX, TROPONINI in the last 72 hours. No results for input(s): BNP in the last 72 hours. No results for input(s): TSH in the last 72 hours.   No results for input(s): CHOL, HDL, LDLCALC, TRIG in the last 72 hours.]    No results found for: CKTOTAL, CKMB, CKMBINDEX, TROPONINI      Imaging:     Last ECG (if available) - EKG:  I have reviewed EKG with the following interpretation  NSR, NST changes    Telemetry:  NSR    Last Stress (if available):    Last TTE/COCO(if available):  Stress perfusion images demonstrate a moderate perfusion defect involving the anteroseptal wall and anteroseptal apex which reverses on rest perfusion images. The findings are concerning for stress-induced ischemia in the LAD territory. Left    ventricular wall motion is normal. Left ventricular ejection fraction is calculated at 67%. Last Cath (if available):    Last CMR  (if available):      Assessment / Plan:     Angina  - abnormal stress test  - plan for [facility] Monday  - NPO after Sunday midnight  - ASA 81mg  - Resume BP meds. - Lipitor 40mg      Tobacco use was discussed with the patient and educated on the negative effects. I have asked the patient to not utilize these agents. Thank you for allowing to us to participate in the [facility]. Further evaluation will be based upon the patient's clinical course and testing results. All questions and concerns were addressed to the patient/family. Alternatives to my treatment were discussed. The note was completed using EMR. Every effort was made to ensure accuracy; however, inadvertent computerized transcription errors may be present.     Joann Caicedo MD

## 2018-08-11 NOTE — H&P
Hospital Medicine History & Physical      PCP: Mariluz Maria MD    Date of Admission: 8/11/2018    Date of Service: Pt seen/examined on 8/11/2018 and Placed in Observation. Chief Complaint:  Chest pain with exertion      History Of Present Illness: Chaya Madrid is a 53 y.o. male who presented to the emergency department after an abnormal stress test done this morning. Patient reports that he has been having substernal chest pain with exertion for the past 2-3 weeks, with resolution of symptoms on rest.  He reports that he has a pretty strong family history of coronary artery disease on his maternal side, with 2 of his maternal uncles having heart attacks in their early 46s. He also has history of hypertension, hyperlipidemia, for which he takes medications. He reports that this exertional chest pain has been getting worse over the past week, therefore he decided disease prior to her physician who got his stress test done which showed 2 mm ST depression with exertion. Therefore, he was sent to the emergency department. He was seen by the cardiologist who recommended observation and patient understands he can get his angiogram on Monday. We're asked to admit. Past Medical History:          Diagnosis Date    Chronic rhinitis 3/26/2018    Depression with anxiety 10/3/2014    Essential hypertension 9/23/2016    Hypertriglyceridemia 8/10/2018    Obstructive sleep apnea syndrome     Umbilical hernia        Past Surgical History:           Procedure Laterality Date    INGUINAL HERNIA REPAIR      UMBILICAL HERNIA REPAIR         Medications Prior to Admission:      Prior to Admission medications    Medication Sig Start Date End Date Taking?  Authorizing Provider   ciprofloxacin (CIPRO) 500 MG tablet Take 1 tablet by mouth 2 times daily for 10 days 8/10/18 8/20/18 Yes Mariluz Maria MD   desvenlafaxine succinate (PRISTIQ) 25 MG TB24 extended release tablet Take 1 tablet by mouth daily 7/19/18  Yes Cecilia Ingram MD   lisinopril (PRINIVIL;ZESTRIL) 10 MG tablet Take 1 tablet by mouth daily 4/30/18  Yes Cecilia Ingram MD   fenofibrate 160 MG tablet Take 1 tablet by mouth daily 12/11/17  Yes Cecilia Ingram MD   LORazepam (ATIVAN) 0.5 MG tablet Take 1 tablet by mouth every 8 hours as needed for Anxiety . 12/7/17  Yes Cecilia Ingram MD   sildenafil (REVATIO) 20 MG tablet Take 1-2 tablets by mouth daily as needed (erectile dysfunction) 12/7/17  Yes Cecilia Ingram MD   fluticasone (FLONASE) 50 MCG/ACT nasal spray 1 spray by Nasal route daily. 3/6/15 3/5/16  Cecilia Ingram MD       Allergies:  No known drug allergy    Social History:      The patient currently lives At home    TOBACCO:   reports that he has never smoked. He has never used smokeless tobacco.  ETOH:   reports that he drinks alcohol. Family History:      Reviewed in detail and Positive as follows:    Family History   Problem Relation Age of Onset    Hypertension Other    , Coronary artery disease in maternal uncles, maternal grandparents    REVIEW OF SYSTEMS:   10 point ROS obtained. Pertinent positives as noted in the HPI. All other systems reviewed and negative. PHYSICAL EXAM:    /79   Pulse 84   Temp 97.8 °F (36.6 °C) (Oral)   Resp 16   Ht 6' 1\" (1.854 m)   Wt 201 lb 11.5 oz (91.5 kg)   SpO2 99%   BMI 26.61 kg/m²       General appearance:  No apparent distress, appears stated age and cooperative. Anxious affect  HEENT:  Normal cephalic, atraumatic without obvious deformity. Pupils equal, round, and reactive to light. Extra ocular muscles intact. Conjunctivae/corneas clear. Neck: Supple, with full range of motion. No jugular venous distention. Trachea midline. Respiratory:  Normal respiratory effort. Clear to auscultation, bilaterally without Rales/Wheezes/Rhonchi. Cardiovascular:  Regular rate and rhythm with normal S1/S2 without murmurs, rubs or gallops.   Abdomen: Soft, non-tender,

## 2018-08-11 NOTE — ED NOTES
Bed: A10-10  Expected date:   Expected time:   Means of arrival:   Comments:  400 Ender Alcaraz RN  08/11/18 1120

## 2018-08-11 NOTE — ED PROVIDER NOTES
[facility]          ATTENDING PHYSICIAN NOTE       Date of evaluation: 8/11/2018    Chief Complaint     Chest Pain (upper chest and jaw pain/pressure for the past month) and Other (\"positive\" stress test)      History of Present Illness     Matilde Sawant is a 53 y.o. male who presents For evaluation after having a positive stress test.  Patient reports he had a positive stress test earlier today was sent to the [facility] for evaluation. Patient only describes chest tightness during the stress test however currently is a symptomatic. Patient without chest pain, difficult breathing, lower extremity swelling, vomiting, syncope. Patient denies any previous history of blood clots. No previous initiation of aspirin. Review of Systems     Review of Systems   Constitutional: Negative for chills and fever. Respiratory: Negative for shortness of breath. Cardiovascular: Positive for chest pain. Negative for palpitations and leg swelling. Gastrointestinal: Negative for abdominal pain. Musculoskeletal: Negative for back pain. All other systems reviewed and are negative. Past Medical, Surgical, Family, and Social History     He has a past medical history of Chronic rhinitis; Depression with anxiety; Essential hypertension; Hypertriglyceridemia; Obstructive sleep apnea syndrome; and Umbilical hernia. He has a past surgical history that includes Inguinal hernia repair and Umbilical hernia repair. His family history includes Hypertension in an other family member. He reports that he has never smoked. He has never used smokeless tobacco. He reports that he drinks alcohol.     Medications     Previous Medications    CIPROFLOXACIN (CIPRO) 500 MG TABLET    Take 1 tablet by mouth 2 times daily for 10 days    DESVENLAFAXINE SUCCINATE (PRISTIQ) 25 MG TB24 EXTENDED RELEASE TABLET    Take 1 tablet by mouth daily    FENOFIBRATE 160 MG TABLET    Take 1 tablet by mouth daily Hemoglobin 16.2 13.5 - 17.5 g/dL    Hematocrit 46.6 40.5 - 52.5 %    MCV 92.8 80.0 - 100.0 fL    MCH 32.2 26.0 - 34.0 pg    MCHC 34.7 31.0 - 36.0 g/dL    RDW 12.8 12.4 - 15.4 %    Platelets 620 878 - 771 K/uL    MPV 10.8 (H) 5.0 - 10.5 fL    Neutrophils % 77.9 %    Lymphocytes % 14.5 %    Monocytes % 6.4 %    Eosinophils % 0.5 %    Basophils % 0.7 %    Neutrophils # 7.8 (H) 1.7 - 7.7 K/uL    Lymphocytes # 1.5 1.0 - 5.1 K/uL    Monocytes # 0.6 0.0 - 1.3 K/uL    Eosinophils # 0.0 0.0 - 0.6 K/uL    Basophils # 0.1 0.0 - 0.2 K/uL   Basic Metabolic Panel (EP - 1)   Result Value Ref Range    Sodium 137 136 - 145 mmol/L    Potassium 4.6 3.5 - 5.1 mmol/L    Chloride 101 99 - 110 mmol/L    CO2 22 21 - 32 mmol/L    Anion Gap 14 3 - 16    Glucose 98 70 - 99 mg/dL    BUN 14 7 - 20 mg/dL    CREATININE 0.9 0.9 - 1.3 mg/dL    GFR Non-African American >60 >60    GFR African American >60 >60    Calcium 9.7 8.3 - 10.6 mg/dL   Troponin (lab)   Result Value Ref Range    Troponin <0.01 <0.01 ng/mL   EKG 12 Lead   Result Value Ref Range    Ventricular Rate 102 BPM    Atrial Rate 102 BPM    P-R Interval 150 ms    QRS Duration 88 ms    Q-T Interval 326 ms    QTc Calculation (Bazett) 424 ms    P Axis 51 degrees    R Axis 37 degrees    T Axis 43 degrees    Diagnosis       EKG performed in ER and to be interpreted by ER physician. Confirmed by MD, ER ([id number]),  Tanya Bailey ([id number]) on 8/11/2018 12:06:59 PM           RECENT VITALS:  BP: (!) 160/106, Temp: 98.3 °F (36.8 °C), Pulse: 82, Resp: 16, SpO2: 100 %         ED Course     Nursing Notes, Past Medical Hx, Past Surgical Hx, Social Hx, Allergies, and Family Hx were reviewed.     The patient was given the following medications:  Orders Placed This Encounter   Medications    aspirin chewable tablet 324 mg       CONSULTS:  IP CONSULT TO HOSPITALIST  IP CONSULT TO [address] [place] [address] / ED / John Brigido is a 53 y.o. male presents for evaluation at the positive stress test.  Concern for possible ACS with likely coronary disease. We'll treat with aspirin. Patient currently asymptomatic. We'll place on cardiac monitoring. Had discussion with cardiology who plans for left heart cath on August 13. Plan admission for further observation. We'll obtain laboratory studies. Reassessment 1300  Initial troponin undetectable  Plan for admission to hospitalist service after discussion    Clinical Impression     1. Chest pain, unspecified type    2.  Positive cardiac stress test        Disposition     DISPOSITION  DISPOSITION Admitted 08/11/2018 01:14:23 PM          (Please note that portions of this note were completed with a voice recognition program.  Efforts were made to edit the dictations but occasionally words are mis-transcribed.)     Em Mckoy MD  08/11/18 6126

## 2018-08-12 LAB
EKG ATRIAL RATE: 75 BPM
EKG DIAGNOSIS: NORMAL
EKG P AXIS: 48 DEGREES
EKG P-R INTERVAL: 160 MS
EKG Q-T INTERVAL: 356 MS
EKG QRS DURATION: 84 MS
EKG QTC CALCULATION (BAZETT): 397 MS
EKG R AXIS: 43 DEGREES
EKG T AXIS: 58 DEGREES
EKG VENTRICULAR RATE: 75 BPM

## 2018-08-12 PROCEDURE — 93005 ELECTROCARDIOGRAM TRACING: CPT | Performed by: EMERGENCY MEDICINE

## 2018-08-12 PROCEDURE — 99232 SBSQ HOSP IP/OBS MODERATE 35: CPT | Performed by: INTERNAL MEDICINE

## 2018-08-12 PROCEDURE — 2580000003 HC RX 258: Performed by: INTERNAL MEDICINE

## 2018-08-12 PROCEDURE — 6370000000 HC RX 637 (ALT 250 FOR IP): Performed by: INTERNAL MEDICINE

## 2018-08-12 PROCEDURE — 93010 ELECTROCARDIOGRAM REPORT: CPT | Performed by: INTERNAL MEDICINE

## 2018-08-12 PROCEDURE — G0378 HOSPITAL OBSERVATION PER HR: HCPCS

## 2018-08-12 RX ADMIN — CARVEDILOL 3.12 MG: 6.25 TABLET, FILM COATED ORAL at 17:15

## 2018-08-12 RX ADMIN — MAGNESIUM HYDROXIDE 30 ML: 400 SUSPENSION ORAL at 21:24

## 2018-08-12 RX ADMIN — Medication 10 ML: at 19:47

## 2018-08-12 RX ADMIN — LISINOPRIL 10 MG: 10 TABLET ORAL at 08:57

## 2018-08-12 RX ADMIN — DESVENLAFAXINE 25 MG: 25 TABLET, EXTENDED RELEASE ORAL at 08:57

## 2018-08-12 RX ADMIN — ASPIRIN 81 MG 81 MG: 81 TABLET ORAL at 08:56

## 2018-08-12 RX ADMIN — ACETAMINOPHEN 650 MG: 325 TABLET ORAL at 15:39

## 2018-08-12 RX ADMIN — Medication 10 ML: at 08:59

## 2018-08-12 RX ADMIN — CARVEDILOL 3.12 MG: 6.25 TABLET, FILM COATED ORAL at 08:56

## 2018-08-12 ASSESSMENT — PAIN SCALES - GENERAL
PAINLEVEL_OUTOF10: 0
PAINLEVEL_OUTOF10: 4
PAINLEVEL_OUTOF10: 0

## 2018-08-12 NOTE — PROGRESS NOTES
Hospitalist Progress Note    PCP: Shamir Erazo MD    Date of Admission: 8/11/2018  Hospital Day: 0      Chief Complaint:   Chief Complaint   Patient presents with    Chest Pain     upper chest and jaw pain/pressure for the past month    Other     \"positive\" stress test           Hospital Course: admitted for abnormal stress test and stable angina, for angiogram       Subjective:   Patient seen and examined  No new complaints, no overnight events  Anticipate angiogram tomorrow     Ancillary notes reviewed    Medications:  Reviewed    Infusion Medications   Scheduled Medications    desvenlafaxine succinate  25 mg Oral Daily    lisinopril  10 mg Oral Daily    sodium chloride flush  10 mL Intravenous 2 times per day    aspirin  81 mg Oral Daily    carvedilol  3.125 mg Oral BID WC    enoxaparin  40 mg Subcutaneous Daily     PRN Meds: LORazepam, sodium chloride flush, acetaminophen, magnesium hydroxide, ondansetron, nitroGLYCERIN      Intake/Output Summary (Last 24 hours) at 08/12/18 1217  Last data filed at 08/11/18 1938   Gross per 24 hour   Intake              960 ml   Output                0 ml   Net              960 ml       Exam:    /84   Pulse 74   Temp 98.5 °F (36.9 °C) (Oral)   Resp 16   Ht 6' 1\" (1.854 m)   Wt 197 lb 1.5 oz (89.4 kg)   SpO2 98%   BMI 26.00 kg/m²       General appearance: No apparent distress, appears stated age and cooperative. HEENT: Pupils equal, round, and reactive to light. Conjunctivae/corneas clear. Neck: Supple, with full range of motion. No jugular venous distention. Trachea midline. Respiratory:  Normal respiratory effort. Clear to auscultation, bilaterally without Rales/Wheezes/Rhonchi. Cardiovascular: Regular rate and rhythm with normal S1/S2 without murmurs, rubs or gallops. Abdomen: Soft, non-tender, non-distended with normal bowel sounds. Musculoskeletal: No clubbing, cyanosis or edema bilaterally.   Full range of motion without

## 2018-08-13 ENCOUNTER — APPOINTMENT (OUTPATIENT)
Dept: CARDIAC CATH/INVASIVE PROCEDURES | Age: 48
DRG: 247 | End: 2018-08-13
Payer: COMMERCIAL

## 2018-08-13 LAB
ANION GAP SERPL CALCULATED.3IONS-SCNC: 11 MMOL/L (ref 3–16)
BUN BLDV-MCNC: 16 MG/DL (ref 7–20)
CALCIUM SERPL-MCNC: 10.1 MG/DL (ref 8.3–10.6)
CHLORIDE BLD-SCNC: 99 MMOL/L (ref 99–110)
CO2: 27 MMOL/L (ref 21–32)
CREAT SERPL-MCNC: 0.9 MG/DL (ref 0.9–1.3)
GFR AFRICAN AMERICAN: >60
GFR NON-AFRICAN AMERICAN: >60
GLUCOSE BLD-MCNC: 118 MG/DL (ref 70–99)
HCT VFR BLD CALC: 43.7 % (ref 40.5–52.5)
HCT VFR BLD CALC: 48.2 % (ref 40.5–52.5)
HEMOGLOBIN: 15 G/DL (ref 13.5–17.5)
HEMOGLOBIN: 16.5 G/DL (ref 13.5–17.5)
INR BLD: 1.06 (ref 0.86–1.14)
MCH RBC QN AUTO: 31.9 PG (ref 26–34)
MCH RBC QN AUTO: 32.3 PG (ref 26–34)
MCHC RBC AUTO-ENTMCNC: 34.2 G/DL (ref 31–36)
MCHC RBC AUTO-ENTMCNC: 34.4 G/DL (ref 31–36)
MCV RBC AUTO: 93.1 FL (ref 80–100)
MCV RBC AUTO: 93.9 FL (ref 80–100)
PDW BLD-RTO: 12.6 % (ref 12.4–15.4)
PDW BLD-RTO: 12.7 % (ref 12.4–15.4)
PLATELET # BLD: 148 K/UL (ref 135–450)
PLATELET # BLD: 156 K/UL (ref 135–450)
PMV BLD AUTO: 10.7 FL (ref 5–10.5)
PMV BLD AUTO: 10.8 FL (ref 5–10.5)
POC ACT LR: 170 SEC (ref 113–149)
POC ACT LR: 234 SEC (ref 113–149)
POC ACT LR: 246 SEC (ref 113–149)
POC ACT LR: >400 SEC (ref 113–149)
POTASSIUM SERPL-SCNC: 4.5 MMOL/L (ref 3.5–5.1)
PROTHROMBIN TIME: 12.1 SEC (ref 9.8–13)
RBC # BLD: 4.65 M/UL (ref 4.2–5.9)
RBC # BLD: 5.18 M/UL (ref 4.2–5.9)
SODIUM BLD-SCNC: 137 MMOL/L (ref 136–145)
WBC # BLD: 7.7 K/UL (ref 4–11)
WBC # BLD: 9.6 K/UL (ref 4–11)

## 2018-08-13 PROCEDURE — C1725 CATH, TRANSLUMIN NON-LASER: HCPCS

## 2018-08-13 PROCEDURE — 6370000000 HC RX 637 (ALT 250 FOR IP): Performed by: NURSE PRACTITIONER

## 2018-08-13 PROCEDURE — 92928 PRQ TCAT PLMT NTRAC ST 1 LES: CPT

## 2018-08-13 PROCEDURE — 85027 COMPLETE CBC AUTOMATED: CPT

## 2018-08-13 PROCEDURE — 2500000003 HC RX 250 WO HCPCS

## 2018-08-13 PROCEDURE — 6360000002 HC RX W HCPCS: Performed by: NURSE PRACTITIONER

## 2018-08-13 PROCEDURE — APPSS30 APP SPLIT SHARED TIME 16-30 MINUTES: Performed by: NURSE PRACTITIONER

## 2018-08-13 PROCEDURE — C1887 CATHETER, GUIDING: HCPCS

## 2018-08-13 PROCEDURE — 2709999900 HC NON-CHARGEABLE SUPPLY

## 2018-08-13 PROCEDURE — 99153 MOD SED SAME PHYS/QHP EA: CPT

## 2018-08-13 PROCEDURE — 027135Z DILATION OF CORONARY ARTERY, TWO ARTERIES WITH TWO DRUG-ELUTING INTRALUMINAL DEVICES, PERCUTANEOUS APPROACH: ICD-10-PCS | Performed by: INTERNAL MEDICINE

## 2018-08-13 PROCEDURE — 36415 COLL VENOUS BLD VENIPUNCTURE: CPT

## 2018-08-13 PROCEDURE — 93458 L HRT ARTERY/VENTRICLE ANGIO: CPT | Performed by: INTERNAL MEDICINE

## 2018-08-13 PROCEDURE — B2111ZZ FLUOROSCOPY OF MULTIPLE CORONARY ARTERIES USING LOW OSMOLAR CONTRAST: ICD-10-PCS | Performed by: INTERNAL MEDICINE

## 2018-08-13 PROCEDURE — 85610 PROTHROMBIN TIME: CPT

## 2018-08-13 PROCEDURE — 2580000003 HC RX 258: Performed by: INTERNAL MEDICINE

## 2018-08-13 PROCEDURE — 80048 BASIC METABOLIC PNL TOTAL CA: CPT

## 2018-08-13 PROCEDURE — C1894 INTRO/SHEATH, NON-LASER: HCPCS

## 2018-08-13 PROCEDURE — 1200000000 HC SEMI PRIVATE

## 2018-08-13 PROCEDURE — 2580000003 HC RX 258: Performed by: NURSE PRACTITIONER

## 2018-08-13 PROCEDURE — B2151ZZ FLUOROSCOPY OF LEFT HEART USING LOW OSMOLAR CONTRAST: ICD-10-PCS | Performed by: INTERNAL MEDICINE

## 2018-08-13 PROCEDURE — 99024 POSTOP FOLLOW-UP VISIT: CPT | Performed by: INTERNAL MEDICINE

## 2018-08-13 PROCEDURE — 99152 MOD SED SAME PHYS/QHP 5/>YRS: CPT | Performed by: INTERNAL MEDICINE

## 2018-08-13 PROCEDURE — 6360000004 HC RX CONTRAST MEDICATION: Performed by: INTERNAL MEDICINE

## 2018-08-13 PROCEDURE — 6370000000 HC RX 637 (ALT 250 FOR IP): Performed by: INTERNAL MEDICINE

## 2018-08-13 PROCEDURE — 6370000000 HC RX 637 (ALT 250 FOR IP)

## 2018-08-13 PROCEDURE — 99152 MOD SED SAME PHYS/QHP 5/>YRS: CPT

## 2018-08-13 PROCEDURE — 92928 PRQ TCAT PLMT NTRAC ST 1 LES: CPT | Performed by: INTERNAL MEDICINE

## 2018-08-13 PROCEDURE — 4A023N7 MEASUREMENT OF CARDIAC SAMPLING AND PRESSURE, LEFT HEART, PERCUTANEOUS APPROACH: ICD-10-PCS | Performed by: INTERNAL MEDICINE

## 2018-08-13 PROCEDURE — 6360000002 HC RX W HCPCS

## 2018-08-13 PROCEDURE — 93458 L HRT ARTERY/VENTRICLE ANGIO: CPT

## 2018-08-13 PROCEDURE — C1874 STENT, COATED/COV W/DEL SYS: HCPCS

## 2018-08-13 PROCEDURE — 85347 COAGULATION TIME ACTIVATED: CPT

## 2018-08-13 PROCEDURE — C1769 GUIDE WIRE: HCPCS

## 2018-08-13 RX ORDER — LORAZEPAM 0.5 MG/1
1 TABLET ORAL EVERY 6 HOURS PRN
Status: DISCONTINUED | OUTPATIENT
Start: 2018-08-13 | End: 2018-08-14 | Stop reason: HOSPADM

## 2018-08-13 RX ORDER — PRASUGREL 10 MG/1
10 TABLET, FILM COATED ORAL DAILY
Status: DISCONTINUED | OUTPATIENT
Start: 2018-08-14 | End: 2018-08-14 | Stop reason: HOSPADM

## 2018-08-13 RX ORDER — ATORVASTATIN CALCIUM 40 MG/1
40 TABLET, FILM COATED ORAL NIGHTLY
Status: DISCONTINUED | OUTPATIENT
Start: 2018-08-13 | End: 2018-08-13

## 2018-08-13 RX ORDER — SODIUM CHLORIDE 9 MG/ML
INJECTION, SOLUTION INTRAVENOUS CONTINUOUS
Status: ACTIVE | OUTPATIENT
Start: 2018-08-13 | End: 2018-08-13

## 2018-08-13 RX ORDER — ACETAMINOPHEN 325 MG/1
650 TABLET ORAL EVERY 4 HOURS PRN
Status: DISCONTINUED | OUTPATIENT
Start: 2018-08-13 | End: 2018-08-14 | Stop reason: HOSPADM

## 2018-08-13 RX ORDER — SODIUM CHLORIDE 0.9 % (FLUSH) 0.9 %
10 SYRINGE (ML) INJECTION PRN
Status: DISCONTINUED | OUTPATIENT
Start: 2018-08-13 | End: 2018-08-14 | Stop reason: HOSPADM

## 2018-08-13 RX ORDER — ONDANSETRON 2 MG/ML
4 INJECTION INTRAMUSCULAR; INTRAVENOUS EVERY 6 HOURS PRN
Status: CANCELLED | OUTPATIENT
Start: 2018-08-13

## 2018-08-13 RX ORDER — SODIUM CHLORIDE 0.9 % (FLUSH) 0.9 %
10 SYRINGE (ML) INJECTION EVERY 12 HOURS SCHEDULED
Status: DISCONTINUED | OUTPATIENT
Start: 2018-08-13 | End: 2018-08-14 | Stop reason: HOSPADM

## 2018-08-13 RX ORDER — ATORVASTATIN CALCIUM 80 MG/1
80 TABLET, FILM COATED ORAL NIGHTLY
Status: DISCONTINUED | OUTPATIENT
Start: 2018-08-13 | End: 2018-08-14 | Stop reason: HOSPADM

## 2018-08-13 RX ADMIN — ACETAMINOPHEN 650 MG: 325 TABLET ORAL at 20:22

## 2018-08-13 RX ADMIN — Medication 10 ML: at 08:19

## 2018-08-13 RX ADMIN — ACETAMINOPHEN 650 MG: 325 TABLET ORAL at 15:46

## 2018-08-13 RX ADMIN — ASPIRIN 81 MG 81 MG: 81 TABLET ORAL at 08:17

## 2018-08-13 RX ADMIN — TIROFIBAN 0.15 MCG/KG/MIN: 5 INJECTION, SOLUTION INTRAVENOUS at 15:48

## 2018-08-13 RX ADMIN — IOVERSOL 210 ML: 509 INJECTION INTRA-ARTERIAL; INTRAVENOUS at 14:02

## 2018-08-13 RX ADMIN — ATORVASTATIN CALCIUM 80 MG: 80 TABLET, FILM COATED ORAL at 20:22

## 2018-08-13 RX ADMIN — DESVENLAFAXINE 25 MG: 25 TABLET, EXTENDED RELEASE ORAL at 08:20

## 2018-08-13 RX ADMIN — LORAZEPAM 0.5 MG: 0.5 TABLET ORAL at 08:17

## 2018-08-13 RX ADMIN — CARVEDILOL 3.12 MG: 6.25 TABLET, FILM COATED ORAL at 08:17

## 2018-08-13 RX ADMIN — SODIUM CHLORIDE: 900 INJECTION, SOLUTION INTRAVENOUS at 15:47

## 2018-08-13 RX ADMIN — LISINOPRIL 10 MG: 10 TABLET ORAL at 08:17

## 2018-08-13 RX ADMIN — CARVEDILOL 3.12 MG: 6.25 TABLET, FILM COATED ORAL at 17:55

## 2018-08-13 ASSESSMENT — PAIN DESCRIPTION - ORIENTATION: ORIENTATION: LOWER

## 2018-08-13 ASSESSMENT — PAIN DESCRIPTION - FREQUENCY: FREQUENCY: INTERMITTENT

## 2018-08-13 ASSESSMENT — PAIN SCALES - GENERAL
PAINLEVEL_OUTOF10: 0
PAINLEVEL_OUTOF10: 5
PAINLEVEL_OUTOF10: 7
PAINLEVEL_OUTOF10: 0
PAINLEVEL_OUTOF10: 0

## 2018-08-13 ASSESSMENT — PAIN DESCRIPTION - PROGRESSION: CLINICAL_PROGRESSION: NOT CHANGED

## 2018-08-13 ASSESSMENT — PAIN DESCRIPTION - PAIN TYPE: TYPE: ACUTE PAIN

## 2018-08-13 ASSESSMENT — PAIN DESCRIPTION - LOCATION: LOCATION: BACK

## 2018-08-13 ASSESSMENT — PAIN DESCRIPTION - DESCRIPTORS: DESCRIPTORS: ACHING;DULL

## 2018-08-13 ASSESSMENT — PAIN DESCRIPTION - ONSET: ONSET: ON-GOING

## 2018-08-13 NOTE — PROGRESS NOTES
[facility] [place] [employer] [facility]    8/13/2018 2:23 PM        Name: Ike Gallagher . Admitted: 8/11/2018  Primary Care Provider: Ariel Huggins MD (Tel: [phone])      Subjective:      Patient was admitted with angina, had LHC today with stents to mid LAD and mid right Posterior Descending artery. No chest pain. Some tight feeling in his throat. Not short of breath. Reviewed interval ancillary notes    Objective:  /79   Pulse 74   Temp 97.5 °F (36.4 °C)   Resp 16   Ht 6' 1\" (1.854 m)   Wt 196 lb 6.9 oz (89.1 kg)   SpO2 97%   BMI 25.92 kg/m²     Intake/Output Summary (Last 24 hours) at 08/13/18 1423  Last data filed at 08/13/18 1149   Gross per 24 hour   Intake              750 ml   Output                0 ml   Net              750 ml    Wt Readings from Last 3 Encounters:   08/13/18 196 lb 6.9 oz (89.1 kg)   08/10/18 205 lb (93 kg)   07/17/18 203 lb (92.1 kg)       General appearance:  Appears comfortable, no distress  Head:  Atraumatic normocephalic  Neck: supple  Cardiovascular: Regular rhythm, normal S1, S2. No murmur. No edema in lower extremities  Respiratory: Not using accessory muscles. Good inspiratory effort. Clear to auscultation bilaterally, no wheeze or crackles. GI: Abdomen soft, no tenderness, not distended, normal bowel sounds  Musculoskeletal: strength symmetric  Neurology: awake and alert, cooperative      Labs and Tests:  CBC:   Recent Labs      08/11/18   1224  08/13/18   1114   WBC  10.1  7.7   HGB  16.2  16.5   PLT  148  156     BMP:  Recent Labs      08/11/18   1225  08/13/18   1114   NA  137  137   K  4.6  4.5   CL  101  99   CO2  22  27   BUN  14  16   CREATININE  0.9  0.9   GLUCOSE  98  118*     Hepatic: No results for input(s): AST, ALT, ALB, BILITOT, ALKPHOS in the last 72 hours.     Trop negative    Assessment & Plan:   Principal Problem:    Abnormal stress test  Active Problems:    Angina of effort [facility])    Essential hypertension    Hypertriglyceridemia     Continue to monitor s/p stent x 2 today. Continue ASA, effient, beta blocker, statin.     Diet: Diet NPO, After Midnight  Code:Full Code  DVT PPX lennox    German Coronado MD   8/13/2018 2:23 PM\

## 2018-08-13 NOTE — PROCEDURES
least 40% disease. Circumflex:  OM1 OM2 are high on the LCx and very small. Very large bifurcating true OM3. Superior division with a 50% stenosis. Inferior division with a 40% stenosis. Right Coronary:  Large vessel. Right PDA with 90% stenosis    Left Ventriculogram:  Not performed; patient had recent echo. Femoral Artery:  No plaque    Intervention:  Anticoagulation with heparin and aggrastat was used (see nursing notes for details). Mid LAD:  Guide: EBU 3.5 Guide Catheter  Wire: BMW  Pre-dilation: 2.0 x 12 Trek  Stent: 2.75 x 12 mm Xience [place]    Mid Right Posterior Descending Artery:  Guide: JR4 Guide catheter  Stent: 2.25 x 12 mm Xience [place] stent      Angiograms were performed post-angioplasty/stent deployment. MEHRAN 3 flow was present after the intervention. Hemodynamics (mm Hg):  Left Ventricular Pressure:  112/0, 4  Central Aortic Pressure:  114/89 (101)    Conclusions:  -Severe 2 vessel CAD  -2.75 x 12 mm CONRAD to the mid LAD and 2.25 x 12 mm CONRAD to the right PDA  -Normal LVEDP    Recommendations:  -Maximize statin therapy  -ACE and BB as tolerated  -ASA and effient. After 1 year, would consider changing effient to plavix    Renny Alba MD, [facility]

## 2018-08-13 NOTE — PROGRESS NOTES
Sheath pulled per order. Manual pressure held for 18 minutes. tegaderm applied. No oozing or hematoma noted. Bed rest restarted for three hours. Vss. Will continue to monitor.

## 2018-08-13 NOTE — PROGRESS NOTES
Cardiology Consult Service  Daily Progress Note        Admit Date:  8/11/2018    Subjective: Interval history:  CHRISTI    Medications:   desvenlafaxine succinate  25 mg Oral Daily    lisinopril  10 mg Oral Daily    sodium chloride flush  10 mL Intravenous 2 times per day    aspirin  81 mg Oral Daily    carvedilol  3.125 mg Oral BID WC    enoxaparin  40 mg Subcutaneous Daily       IV drips:      PRN:  LORazepam, sodium chloride flush, acetaminophen, magnesium hydroxide, ondansetron, nitroGLYCERIN    Objective:     Vitals:    08/12/18 1526 08/12/18 1954 08/12/18 2317 08/13/18 0555   BP: 120/72 124/89 (!) 140/92 (!) 148/96   Pulse: 72 88 79 87   Resp: 18 18 16 16   Temp: 98.1 °F (36.7 °C) 98.5 °F (36.9 °C) 97.7 °F (36.5 °C) 97.5 °F (36.4 °C)   TempSrc: Oral Oral Oral Oral   SpO2: 97% 98% 97% 100%   Weight:    196 lb 6.9 oz (89.1 kg)   Height:           Intake/Output Summary (Last 24 hours) at 08/13/18 0811  Last data filed at 08/13/18 0555   Gross per 24 hour   Intake              730 ml   Output                0 ml   Net              730 ml     I/O last 3 completed shifts: In: 36 [P.O.:720; I.V.:10]  Out: -   Wt Readings from Last 3 Encounters:   08/13/18 196 lb 6.9 oz (89.1 kg)   08/10/18 205 lb (93 kg)   07/17/18 203 lb (92.1 kg)       Admit Wt: Weight: 205 lb (93 kg)   Todays Wt: Weight: 196 lb 6.9 oz (89.1 kg)    TELEMETRY: Sinus     Physical Exam:     General:  Awake, alert, NAD  Skin:  Warm and dry  Neck:  JVP   Chest:  Clear to auscultation, respiration normal  Cardiovascular:  RRR S1S2  Abdomen:  Soft   Extremities:  - edema      Objective    Labs:   Recent Labs      08/11/18   1225   NA  137   K  4.6   BUN  14   CREATININE  0.9   CL  101   CO2  22   GLUCOSE  98   CALCIUM  9.7     Recent Labs      08/11/18   1224   WBC  10.1   HGB  16.2   HCT  46.6   PLT  148   MCV  92.8     No results for input(s): CHOLTOT, TRIG, HDL in the last 72 hours.     Invalid input(s): LIPIDCOMM, CHOLHDL, VLDCHOL, LDL  No

## 2018-08-14 VITALS
HEART RATE: 70 BPM | OXYGEN SATURATION: 99 % | WEIGHT: 196.21 LBS | DIASTOLIC BLOOD PRESSURE: 81 MMHG | HEIGHT: 73 IN | SYSTOLIC BLOOD PRESSURE: 144 MMHG | TEMPERATURE: 97.6 F | BODY MASS INDEX: 26 KG/M2 | RESPIRATION RATE: 18 BRPM

## 2018-08-14 LAB
ANION GAP SERPL CALCULATED.3IONS-SCNC: 10 MMOL/L (ref 3–16)
BUN BLDV-MCNC: 17 MG/DL (ref 7–20)
CALCIUM SERPL-MCNC: 9.2 MG/DL (ref 8.3–10.6)
CHLORIDE BLD-SCNC: 103 MMOL/L (ref 99–110)
CO2: 26 MMOL/L (ref 21–32)
CREAT SERPL-MCNC: 0.9 MG/DL (ref 0.9–1.3)
GFR AFRICAN AMERICAN: >60
GFR NON-AFRICAN AMERICAN: >60
GLUCOSE BLD-MCNC: 107 MG/DL (ref 70–99)
HCT VFR BLD CALC: 41.4 % (ref 40.5–52.5)
HEMOGLOBIN: 14.4 G/DL (ref 13.5–17.5)
MAGNESIUM: 2.2 MG/DL (ref 1.8–2.4)
MCH RBC QN AUTO: 32.2 PG (ref 26–34)
MCHC RBC AUTO-ENTMCNC: 34.7 G/DL (ref 31–36)
MCV RBC AUTO: 92.8 FL (ref 80–100)
PDW BLD-RTO: 12.7 % (ref 12.4–15.4)
PLATELET # BLD: 138 K/UL (ref 135–450)
PMV BLD AUTO: 10.6 FL (ref 5–10.5)
POTASSIUM SERPL-SCNC: 4.3 MMOL/L (ref 3.5–5.1)
RBC # BLD: 4.46 M/UL (ref 4.2–5.9)
SODIUM BLD-SCNC: 139 MMOL/L (ref 136–145)
WBC # BLD: 6.8 K/UL (ref 4–11)

## 2018-08-14 PROCEDURE — 6370000000 HC RX 637 (ALT 250 FOR IP): Performed by: NURSE PRACTITIONER

## 2018-08-14 PROCEDURE — 85027 COMPLETE CBC AUTOMATED: CPT

## 2018-08-14 PROCEDURE — 99233 SBSQ HOSP IP/OBS HIGH 50: CPT | Performed by: NURSE PRACTITIONER

## 2018-08-14 PROCEDURE — 83735 ASSAY OF MAGNESIUM: CPT

## 2018-08-14 PROCEDURE — 2580000003 HC RX 258: Performed by: NURSE PRACTITIONER

## 2018-08-14 PROCEDURE — 93005 ELECTROCARDIOGRAM TRACING: CPT | Performed by: NURSE PRACTITIONER

## 2018-08-14 PROCEDURE — 80048 BASIC METABOLIC PNL TOTAL CA: CPT

## 2018-08-14 PROCEDURE — 36415 COLL VENOUS BLD VENIPUNCTURE: CPT

## 2018-08-14 PROCEDURE — 6370000000 HC RX 637 (ALT 250 FOR IP): Performed by: INTERNAL MEDICINE

## 2018-08-14 RX ORDER — ATORVASTATIN CALCIUM 80 MG/1
80 TABLET, FILM COATED ORAL NIGHTLY
Qty: 30 TABLET | Refills: 3 | Status: SHIPPED | OUTPATIENT
Start: 2018-08-14 | End: 2018-12-17 | Stop reason: SDUPTHER

## 2018-08-14 RX ORDER — ACETAMINOPHEN 325 MG/1
650 TABLET ORAL EVERY 6 HOURS PRN
Qty: 120 TABLET | Refills: 3 | COMMUNITY
Start: 2018-08-14 | End: 2020-02-17

## 2018-08-14 RX ORDER — NITROGLYCERIN 0.4 MG/1
TABLET SUBLINGUAL
Qty: 25 TABLET | Refills: 3 | Status: SHIPPED | OUTPATIENT
Start: 2018-08-14 | End: 2020-02-17

## 2018-08-14 RX ORDER — PRASUGREL 10 MG/1
10 TABLET, FILM COATED ORAL DAILY
Qty: 30 TABLET | Refills: 5 | Status: SHIPPED | OUTPATIENT
Start: 2018-08-15 | End: 2019-02-08 | Stop reason: SDUPTHER

## 2018-08-14 RX ORDER — ASPIRIN 81 MG/1
81 TABLET, CHEWABLE ORAL DAILY
Qty: 30 TABLET | Refills: 3 | Status: SHIPPED | OUTPATIENT
Start: 2018-08-15

## 2018-08-14 RX ORDER — CARVEDILOL 3.12 MG/1
3.12 TABLET ORAL 2 TIMES DAILY WITH MEALS
Qty: 60 TABLET | Refills: 3 | Status: SHIPPED | OUTPATIENT
Start: 2018-08-14 | End: 2018-12-17 | Stop reason: SDUPTHER

## 2018-08-14 RX ADMIN — DESVENLAFAXINE 25 MG: 25 TABLET, EXTENDED RELEASE ORAL at 10:09

## 2018-08-14 RX ADMIN — ASPIRIN 81 MG 81 MG: 81 TABLET ORAL at 09:32

## 2018-08-14 RX ADMIN — LISINOPRIL 10 MG: 10 TABLET ORAL at 09:32

## 2018-08-14 RX ADMIN — CARVEDILOL 3.12 MG: 6.25 TABLET, FILM COATED ORAL at 09:31

## 2018-08-14 RX ADMIN — PRASUGREL 10 MG: 10 TABLET, FILM COATED ORAL at 09:32

## 2018-08-14 RX ADMIN — Medication 10 ML: at 09:34

## 2018-08-14 ASSESSMENT — PAIN SCALES - GENERAL
PAINLEVEL_OUTOF10: 0

## 2018-08-14 NOTE — PROGRESS NOTES
Right Posterior Descending Artery:  Guide: JR4 Guide catheter  Stent: 2.25 x 12 mm Xience [place] stent     Angiograms were performed post-angioplasty/stent deployment. MEHRAN 3 flow was present after the intervention. Hemodynamics (mm Hg):  Left Ventricular Pressure:  112/0, 4  Central Aortic Pressure:  114/89 (101)    Assessment:  53 y.o. with chest pain.  Now s/p PCI of LAD with 2.75 x 12 mm Xience [place] and Mid right PDA with 2.25x 12 mm Xience [place] CONRAD  Issues:  -CAD  -HTN  -HLD  -BUSHRA    Plan:  -Keep K>4, Mg>2.  -ASA, statin, effient  -Lisinopril and coreg  -Follow up with me in 1 week and Dr. Dania Chi in 1 month

## 2018-08-14 NOTE — DISCHARGE SUMMARY
bifurcating true OM3. Superior division with a 50% stenosis. Inferior division with a 40% stenosis.      Right Coronary:  Large vessel. Right PDA with 90% stenosis     Left Ventriculogram:  Not performed; patient had recent echo.     Femoral Artery:  No plaque     Intervention:  Anticoagulation with heparin and aggrastat was used (see nursing notes for details).       Mid LAD:  Guide: EBU 3.5 Guide Catheter  Wire: BMW  Pre-dilation: 2.0 x 12 Trek  Stent: 2.75 x 12 mm Xience [place]     Mid Right Posterior Descending Artery:  Guide: JR4 Guide catheter  Stent: 2.25 x 12 mm Xience [place] stent        Angiograms were performed post-angioplasty/stent deployment. MEHRAN 3 flow was present after the intervention.     Hemodynamics (mm Hg):  Left Ventricular Pressure:  112/0, 4  Central Aortic Pressure:  114/89 (101)     Conclusions:  -Severe 2 vessel CAD  -2.75 x 12 mm CONRAD to the mid LAD and 2.25 x 12 mm CONRAD to the right PDA  -Normal LVEDP     Recommendations:  -Maximize statin therapy  -ACE and BB as tolerated  -ASA and effient. After 1 year, would consider changing effient to plavix     Renny Farfan MD, [address] [facility]. Patient was admitted with unstable angina and abnormal stress test as outpatient. He underwent LHC with 2 stents as above. Cr was stable. He is not to stop aspirin and effient unless Dr. Shruthi Farfan approves for a year. He is discharged home in stable condition. He was started on Lipitor and will need LFTs check in 6 weeks. Condition at discharge:  Stable    Consults. IP CONSULT TO HOSPITALIST  IP CONSULT TO CARDIOLOGY  IP CONSULT TO CARDIAC REHAB    Physical examination on discharge day. BP (!) 144/81   Pulse 80   Temp 97.6 °F (36.4 °C) (Oral)   Resp 18   Ht 6' 1\" (1.854 m)   Wt 196 lb 3.4 oz (89 kg)   SpO2 99%   BMI 25.89 kg/m²   General appearance. Alert. Looks comfortable. HEENT. Moist mucus membranes. Cardiovascular. Regular rate and rhythm, normal S1, S2. No murmur. medications from any pharmacy    Bring a paper prescription for each of these medications  · aspirin 81 MG chewable tablet  · atorvastatin 80 MG tablet  · carvedilol 3.125 MG tablet  · nitroGLYCERIN 0.4 MG SL tablet  · prasugrel 10 MG Tabs  You don't need a prescription for these medications  · acetaminophen 325 MG tablet  · magnesium hydroxide 400 MG/5ML suspension         Discharge recommendations given to patient. Follow Up. Nirmala Jimenes MD   in 1 week   Dr. Gurwinder Yeh 1 month. Ene Lorenz 1 week  Disposition. home  Activity. activity as tolerated  Diet: DIET CARDIAC;      Spent 32 minutes in discharge process.     Signed:  Norris Davis MD     8/14/2018 10:31 AM

## 2018-08-15 ENCOUNTER — HOSPITAL ENCOUNTER (OUTPATIENT)
Dept: NON INVASIVE DIAGNOSTICS | Age: 48
Discharge: HOME | End: 2018-08-15
Payer: COMMERCIAL

## 2018-08-15 LAB
EKG ATRIAL RATE: 62 BPM
EKG DIAGNOSIS: NORMAL
EKG P AXIS: 53 DEGREES
EKG P-R INTERVAL: 144 MS
EKG Q-T INTERVAL: 398 MS
EKG QRS DURATION: 88 MS
EKG QTC CALCULATION (BAZETT): 403 MS
EKG R AXIS: 60 DEGREES
EKG T AXIS: 68 DEGREES
EKG VENTRICULAR RATE: 62 BPM

## 2018-08-29 ENCOUNTER — OFFICE VISIT (OUTPATIENT)
Dept: CARDIOLOGY CLINIC | Age: 48
End: 2018-08-29

## 2018-08-29 VITALS
HEART RATE: 58 BPM | WEIGHT: 200.4 LBS | SYSTOLIC BLOOD PRESSURE: 120 MMHG | DIASTOLIC BLOOD PRESSURE: 88 MMHG | BODY MASS INDEX: 26.44 KG/M2

## 2018-08-29 DIAGNOSIS — Z98.61 S/P PTCA (PERCUTANEOUS TRANSLUMINAL CORONARY ANGIOPLASTY): ICD-10-CM

## 2018-08-29 DIAGNOSIS — I10 ESSENTIAL HYPERTENSION: ICD-10-CM

## 2018-08-29 DIAGNOSIS — I25.10 CORONARY ARTERY DISEASE INVOLVING NATIVE CORONARY ARTERY OF NATIVE HEART WITHOUT ANGINA PECTORIS: Primary | ICD-10-CM

## 2018-08-29 DIAGNOSIS — E78.2 MIXED HYPERLIPIDEMIA: ICD-10-CM

## 2018-08-29 PROCEDURE — G8419 CALC BMI OUT NRM PARAM NOF/U: HCPCS | Performed by: NURSE PRACTITIONER

## 2018-08-29 PROCEDURE — 1111F DSCHRG MED/CURRENT MED MERGE: CPT | Performed by: NURSE PRACTITIONER

## 2018-08-29 PROCEDURE — G8598 ASA/ANTIPLAT THER USED: HCPCS | Performed by: NURSE PRACTITIONER

## 2018-08-29 PROCEDURE — G8427 DOCREV CUR MEDS BY ELIG CLIN: HCPCS | Performed by: NURSE PRACTITIONER

## 2018-08-29 PROCEDURE — 99214 OFFICE O/P EST MOD 30 MIN: CPT | Performed by: NURSE PRACTITIONER

## 2018-08-29 PROCEDURE — 1036F TOBACCO NON-USER: CPT | Performed by: NURSE PRACTITIONER

## 2018-09-11 DIAGNOSIS — I10 ESSENTIAL HYPERTENSION: Chronic | ICD-10-CM

## 2018-09-11 DIAGNOSIS — E78.1 HYPERTRIGLYCERIDEMIA: Chronic | ICD-10-CM

## 2018-09-11 DIAGNOSIS — Z98.61 CORONARY ARTERIOSCLEROSIS AFTER PERCUTANEOUS TRANSLUMINAL CORONARY ANGIOPLASTY (PTCA): Primary | ICD-10-CM

## 2018-09-11 DIAGNOSIS — I25.10 CORONARY ARTERIOSCLEROSIS AFTER PERCUTANEOUS TRANSLUMINAL CORONARY ANGIOPLASTY (PTCA): Primary | ICD-10-CM

## 2018-09-11 DIAGNOSIS — I25.110 CORONARY ARTERY DISEASE INVOLVING NATIVE CORONARY ARTERY OF NATIVE HEART WITH UNSTABLE ANGINA PECTORIS (HCC): ICD-10-CM

## 2018-09-17 ENCOUNTER — OFFICE VISIT (OUTPATIENT)
Dept: CARDIOLOGY CLINIC | Age: 48
End: 2018-09-17

## 2018-09-17 VITALS
SYSTOLIC BLOOD PRESSURE: 110 MMHG | WEIGHT: 199.8 LBS | DIASTOLIC BLOOD PRESSURE: 80 MMHG | BODY MASS INDEX: 26.36 KG/M2 | HEART RATE: 62 BPM

## 2018-09-17 DIAGNOSIS — I10 ESSENTIAL HYPERTENSION: Chronic | ICD-10-CM

## 2018-09-17 DIAGNOSIS — E78.1 HYPERTRIGLYCERIDEMIA: Chronic | ICD-10-CM

## 2018-09-17 DIAGNOSIS — I25.10 CORONARY ARTERY DISEASE INVOLVING NATIVE CORONARY ARTERY OF NATIVE HEART WITHOUT ANGINA PECTORIS: Primary | ICD-10-CM

## 2018-09-17 PROCEDURE — 99214 OFFICE O/P EST MOD 30 MIN: CPT | Performed by: INTERNAL MEDICINE

## 2018-09-17 PROCEDURE — 1036F TOBACCO NON-USER: CPT | Performed by: INTERNAL MEDICINE

## 2018-09-17 PROCEDURE — G8419 CALC BMI OUT NRM PARAM NOF/U: HCPCS | Performed by: INTERNAL MEDICINE

## 2018-09-17 PROCEDURE — G8598 ASA/ANTIPLAT THER USED: HCPCS | Performed by: INTERNAL MEDICINE

## 2018-09-17 PROCEDURE — G8427 DOCREV CUR MEDS BY ELIG CLIN: HCPCS | Performed by: INTERNAL MEDICINE

## 2018-09-17 NOTE — PROGRESS NOTES
53 y.o. with CAD here for f/u. Improved diet. Cut out fast food, fresh fruits/veggies. Chicken/fish, less red meat. Had CP for a week or 2 after (lump in throat, really). No n/v/LH/syncope. Cutting grass, walking dog. Has plans for cardiac rehab. No groin pain. Past Medical History:   Diagnosis Date    CAD (coronary artery disease) 08/13/2018     PCI LAD with 2.75 x 12 mm Xience [place] and Mid right PDA with 2.25x 12 mm Xience [place] CONRAD    Chronic rhinitis 3/26/2018    Depression with anxiety 10/3/2014    Essential hypertension 9/23/2016    Hypertriglyceridemia 8/10/2018    Obstructive sleep apnea syndrome     Umbilical hernia      Past Surgical History:   Procedure Laterality Date    CORONARY ANGIOPLASTY WITH STENT PLACEMENT  08/13/2018    x 2    INGUINAL HERNIA REPAIR      UMBILICAL HERNIA REPAIR       Social History   Substance Use Topics    Smoking status: Never Smoker    Smokeless tobacco: Never Used    Alcohol use Yes      Comment: rarely     Allergies   Allergen Reactions    No Known Drug Allergy      Family History   Problem Relation Age of Onset    Hypertension Other      Review of Systems   General: No fevers, chills, fatigue, or night sweats. No abnormal changes in weight. HEENT: No blurry or decreased vision. No changes in hearing, nasal discharge or sore throat. Cardiovascular: See HPI. No cramping in legs or buttocks when walking. Respiratory: No cough, hemoptysis, or wheezing. No history of asthma. Gastrointestinal: No abdominal pain, hematochezia, melana, or history of GI ulcers. Genito-Urinary: No dysuria or hematuria. No urgency or polyuria. Musculoskeletal: No complaints of joint pain, joint swelling or muscular weakness/soreness. Neurological: No dizziness or headaches. No numbness/tingling, speech problems or weakness. No history of a stroke or TIA.    Psychological: No anxiety or depression  Hematological and Lymphatic: No abnormal bleeding or bruising, division with a 40% stenosis. Right Coronary:  Large vessel. Right PDA with 90% stenosis  Left Ventriculogram:  Not performed; patient had recent echo. Femoral Artery:  No plaque  ##LAD stented with 2.75 x 12 mm Xience [place]; RPDA stented with a 2.25 x 12 mm [place]     8/2018 TTE: Mild LVH. EF 55%. No sig valve disease      Current Outpatient Prescriptions:     acetaminophen (TYLENOL) 325 MG tablet, Take 2 tablets by mouth every 6 hours as needed for Pain or Fever, Disp: 120 tablet, Rfl: 3    aspirin 81 MG chewable tablet, Take 1 tablet by mouth daily, Disp: 30 tablet, Rfl: 3    nitroGLYCERIN (NITROSTAT) 0.4 MG SL tablet, up to max of 3 total doses. If no relief after 1 dose, call 911., Disp: 25 tablet, Rfl: 3    atorvastatin (LIPITOR) 80 MG tablet, Take 1 tablet by mouth nightly, Disp: 30 tablet, Rfl: 3    carvedilol (COREG) 3.125 MG tablet, Take 1 tablet by mouth 2 times daily (with meals), Disp: 60 tablet, Rfl: 3    magnesium hydroxide (MILK OF MAGNESIA) 400 MG/5ML suspension, Take 30 mLs by mouth daily as needed for Constipation, Disp: , Rfl:     prasugrel (EFFIENT) 10 MG TABS, Take 1 tablet by mouth daily, Disp: 30 tablet, Rfl: 5    desvenlafaxine succinate (PRISTIQ) 25 MG TB24 extended release tablet, Take 1 tablet by mouth daily, Disp: 30 tablet, Rfl: 5    lisinopril (PRINIVIL;ZESTRIL) 10 MG tablet, Take 1 tablet by mouth daily, Disp: 30 tablet, Rfl: 5    LORazepam (ATIVAN) 0.5 MG tablet, Take 1 tablet by mouth every 8 hours as needed for Anxiety . , Disp: 30 tablet, Rfl: 2    fluticasone (FLONASE) 50 MCG/ACT nasal spray, 1 spray by Nasal route daily. , Disp: 1 Bottle, Rfl: 5    A/P:  53 y.o. here for f/u.  1. Coronary artery disease involving native coronary artery of native heart without angina pectoris    2. Essential hypertension    3. Hypertriglyceridemia      Recs:  -Answers about stent/anticoag answered.   -Needs lipids/LFTs; will order now  -Start cardiac rehab  -Cont cv meds for

## 2018-09-26 ENCOUNTER — OFFICE VISIT (OUTPATIENT)
Dept: FAMILY MEDICINE CLINIC | Age: 48
End: 2018-09-26
Payer: COMMERCIAL

## 2018-09-26 ENCOUNTER — HOSPITAL ENCOUNTER (OUTPATIENT)
Dept: CARDIAC REHAB | Age: 48
Setting detail: THERAPIES SERIES
Discharge: HOME | End: 2018-09-26
Payer: COMMERCIAL

## 2018-09-26 VITALS
TEMPERATURE: 98 F | DIASTOLIC BLOOD PRESSURE: 86 MMHG | OXYGEN SATURATION: 97 % | WEIGHT: 196 LBS | HEIGHT: 73 IN | BODY MASS INDEX: 25.98 KG/M2 | SYSTOLIC BLOOD PRESSURE: 114 MMHG | HEART RATE: 82 BPM | RESPIRATION RATE: 12 BRPM

## 2018-09-26 DIAGNOSIS — I10 ESSENTIAL HYPERTENSION: Chronic | ICD-10-CM

## 2018-09-26 DIAGNOSIS — F41.0 PANIC ATTACK: ICD-10-CM

## 2018-09-26 DIAGNOSIS — E78.1 HYPERTRIGLYCERIDEMIA: Chronic | ICD-10-CM

## 2018-09-26 DIAGNOSIS — R39.11 BENIGN PROSTATIC HYPERPLASIA WITH URINARY HESITANCY: ICD-10-CM

## 2018-09-26 DIAGNOSIS — N40.1 BENIGN PROSTATIC HYPERPLASIA WITH URINARY HESITANCY: ICD-10-CM

## 2018-09-26 DIAGNOSIS — I25.110 CORONARY ARTERY DISEASE INVOLVING NATIVE CORONARY ARTERY OF NATIVE HEART WITH UNSTABLE ANGINA PECTORIS (HCC): Primary | ICD-10-CM

## 2018-09-26 PROBLEM — I20.89 ANGINA OF EFFORT: Status: RESOLVED | Noted: 2018-08-11 | Resolved: 2018-09-26

## 2018-09-26 PROBLEM — I20.8 ANGINA OF EFFORT (HCC): Status: RESOLVED | Noted: 2018-08-11 | Resolved: 2018-09-26

## 2018-09-26 LAB
A/G RATIO: 2.3 (ref 1.1–2.2)
ALBUMIN SERPL-MCNC: 5 G/DL (ref 3.4–5)
ALP BLD-CCNC: 67 U/L (ref 40–129)
ALT SERPL-CCNC: 53 U/L (ref 10–40)
ANION GAP SERPL CALCULATED.3IONS-SCNC: 12 MMOL/L (ref 3–16)
AST SERPL-CCNC: 41 U/L (ref 15–37)
BILIRUB SERPL-MCNC: 0.8 MG/DL (ref 0–1)
BUN BLDV-MCNC: 14 MG/DL (ref 7–20)
CALCIUM SERPL-MCNC: 9.3 MG/DL (ref 8.3–10.6)
CHLORIDE BLD-SCNC: 103 MMOL/L (ref 99–110)
CHOLESTEROL, TOTAL: 94 MG/DL (ref 0–199)
CO2: 26 MMOL/L (ref 21–32)
CREAT SERPL-MCNC: 0.9 MG/DL (ref 0.9–1.3)
GFR AFRICAN AMERICAN: >60
GFR NON-AFRICAN AMERICAN: >60
GLOBULIN: 2.2 G/DL
GLUCOSE BLD-MCNC: 97 MG/DL (ref 70–99)
HDLC SERPL-MCNC: 43 MG/DL (ref 40–60)
LDL CHOLESTEROL CALCULATED: 36 MG/DL
POTASSIUM SERPL-SCNC: 4.7 MMOL/L (ref 3.5–5.1)
SODIUM BLD-SCNC: 141 MMOL/L (ref 136–145)
TOTAL PROTEIN: 7.2 G/DL (ref 6.4–8.2)
TRIGL SERPL-MCNC: 74 MG/DL (ref 0–150)
VLDLC SERPL CALC-MCNC: 15 MG/DL

## 2018-09-26 PROCEDURE — G8427 DOCREV CUR MEDS BY ELIG CLIN: HCPCS | Performed by: FAMILY MEDICINE

## 2018-09-26 PROCEDURE — G8598 ASA/ANTIPLAT THER USED: HCPCS | Performed by: FAMILY MEDICINE

## 2018-09-26 PROCEDURE — 1036F TOBACCO NON-USER: CPT | Performed by: FAMILY MEDICINE

## 2018-09-26 PROCEDURE — 99214 OFFICE O/P EST MOD 30 MIN: CPT | Performed by: FAMILY MEDICINE

## 2018-09-26 PROCEDURE — G8419 CALC BMI OUT NRM PARAM NOF/U: HCPCS | Performed by: FAMILY MEDICINE

## 2018-09-26 RX ORDER — LORAZEPAM 0.5 MG/1
0.5 TABLET ORAL EVERY 8 HOURS PRN
Qty: 30 TABLET | Refills: 2 | Status: SHIPPED | OUTPATIENT
Start: 2018-09-26 | End: 2020-02-17 | Stop reason: SDUPTHER

## 2018-09-26 RX ORDER — FENOFIBRATE 145 MG/1
145 TABLET, COATED ORAL DAILY
COMMUNITY
End: 2020-02-17

## 2018-09-26 RX ORDER — TAMSULOSIN HYDROCHLORIDE 0.4 MG/1
0.4 CAPSULE ORAL DAILY
Qty: 30 CAPSULE | Refills: 5 | Status: SHIPPED | OUTPATIENT
Start: 2018-09-26 | End: 2019-07-30 | Stop reason: SDUPTHER

## 2018-09-28 ENCOUNTER — HOSPITAL ENCOUNTER (OUTPATIENT)
Dept: CARDIAC REHAB | Age: 48
Setting detail: THERAPIES SERIES
Discharge: HOME | End: 2018-09-28
Payer: COMMERCIAL

## 2018-09-28 PROCEDURE — 93798 PHYS/QHP OP CAR RHAB W/ECG: CPT

## 2018-10-01 ENCOUNTER — HOSPITAL ENCOUNTER (OUTPATIENT)
Dept: CARDIAC REHAB | Age: 48
Setting detail: THERAPIES SERIES
Discharge: HOME | End: 2018-10-01
Payer: COMMERCIAL

## 2018-10-01 PROCEDURE — 93798 PHYS/QHP OP CAR RHAB W/ECG: CPT

## 2018-10-03 ENCOUNTER — HOSPITAL ENCOUNTER (OUTPATIENT)
Dept: CARDIAC REHAB | Age: 48
Setting detail: THERAPIES SERIES
Discharge: HOME | End: 2018-10-03
Payer: COMMERCIAL

## 2018-10-03 PROCEDURE — 93798 PHYS/QHP OP CAR RHAB W/ECG: CPT

## 2018-10-05 ENCOUNTER — HOSPITAL ENCOUNTER (OUTPATIENT)
Dept: CARDIAC REHAB | Age: 48
Setting detail: THERAPIES SERIES
Discharge: HOME | End: 2018-10-05
Payer: COMMERCIAL

## 2018-10-05 ENCOUNTER — TELEPHONE (OUTPATIENT)
Dept: CARDIOLOGY CLINIC | Age: 48
End: 2018-10-05

## 2018-10-05 PROCEDURE — 93798 PHYS/QHP OP CAR RHAB W/ECG: CPT

## 2018-10-08 ENCOUNTER — HOSPITAL ENCOUNTER (OUTPATIENT)
Dept: CARDIAC REHAB | Age: 48
Setting detail: THERAPIES SERIES
Discharge: HOME | End: 2018-10-08
Payer: COMMERCIAL

## 2018-10-08 PROCEDURE — 93798 PHYS/QHP OP CAR RHAB W/ECG: CPT

## 2018-10-10 ENCOUNTER — HOSPITAL ENCOUNTER (OUTPATIENT)
Dept: CARDIAC REHAB | Age: 48
Setting detail: THERAPIES SERIES
Discharge: HOME | End: 2018-10-10
Payer: COMMERCIAL

## 2018-10-10 PROCEDURE — 93798 PHYS/QHP OP CAR RHAB W/ECG: CPT

## 2018-10-12 ENCOUNTER — APPOINTMENT (OUTPATIENT)
Dept: CARDIAC REHAB | Age: 48
End: 2018-10-12
Payer: COMMERCIAL

## 2018-10-15 ENCOUNTER — HOSPITAL ENCOUNTER (OUTPATIENT)
Dept: CARDIAC REHAB | Age: 48
Setting detail: THERAPIES SERIES
Discharge: HOME | End: 2018-10-15
Payer: COMMERCIAL

## 2018-10-15 PROCEDURE — 93798 PHYS/QHP OP CAR RHAB W/ECG: CPT

## 2018-10-17 ENCOUNTER — APPOINTMENT (OUTPATIENT)
Dept: CARDIAC REHAB | Age: 48
End: 2018-10-17
Payer: COMMERCIAL

## 2018-10-17 ENCOUNTER — HOSPITAL ENCOUNTER (OUTPATIENT)
Dept: CARDIAC REHAB | Age: 48
Setting detail: THERAPIES SERIES
Discharge: HOME | End: 2018-10-17
Payer: COMMERCIAL

## 2018-10-17 PROCEDURE — 93798 PHYS/QHP OP CAR RHAB W/ECG: CPT

## 2018-10-19 ENCOUNTER — HOSPITAL ENCOUNTER (OUTPATIENT)
Dept: CARDIAC REHAB | Age: 48
Setting detail: THERAPIES SERIES
Discharge: HOME | End: 2018-10-19
Payer: COMMERCIAL

## 2018-10-19 PROCEDURE — 93798 PHYS/QHP OP CAR RHAB W/ECG: CPT

## 2018-10-22 ENCOUNTER — HOSPITAL ENCOUNTER (OUTPATIENT)
Dept: CARDIAC REHAB | Age: 48
Setting detail: THERAPIES SERIES
Discharge: HOME | End: 2018-10-22
Payer: COMMERCIAL

## 2018-10-22 PROCEDURE — 93798 PHYS/QHP OP CAR RHAB W/ECG: CPT

## 2018-10-24 ENCOUNTER — HOSPITAL ENCOUNTER (OUTPATIENT)
Dept: CARDIAC REHAB | Age: 48
Setting detail: THERAPIES SERIES
Discharge: HOME | End: 2018-10-24
Payer: COMMERCIAL

## 2018-10-24 PROCEDURE — 93798 PHYS/QHP OP CAR RHAB W/ECG: CPT

## 2018-10-26 ENCOUNTER — HOSPITAL ENCOUNTER (OUTPATIENT)
Dept: CARDIAC REHAB | Age: 48
Setting detail: THERAPIES SERIES
Discharge: HOME | End: 2018-10-26
Payer: COMMERCIAL

## 2018-10-26 PROCEDURE — 93798 PHYS/QHP OP CAR RHAB W/ECG: CPT

## 2018-10-29 ENCOUNTER — HOSPITAL ENCOUNTER (OUTPATIENT)
Dept: CARDIAC REHAB | Age: 48
Setting detail: THERAPIES SERIES
Discharge: HOME | End: 2018-10-29
Payer: COMMERCIAL

## 2018-10-29 PROCEDURE — 93798 PHYS/QHP OP CAR RHAB W/ECG: CPT

## 2018-10-31 ENCOUNTER — HOSPITAL ENCOUNTER (OUTPATIENT)
Dept: CARDIAC REHAB | Age: 48
Setting detail: THERAPIES SERIES
Discharge: HOME | End: 2018-10-31
Payer: COMMERCIAL

## 2018-10-31 PROCEDURE — 93798 PHYS/QHP OP CAR RHAB W/ECG: CPT

## 2018-11-02 ENCOUNTER — HOSPITAL ENCOUNTER (OUTPATIENT)
Dept: CARDIAC REHAB | Age: 48
Setting detail: THERAPIES SERIES
Discharge: HOME | End: 2018-11-02
Payer: COMMERCIAL

## 2018-11-02 PROCEDURE — 93798 PHYS/QHP OP CAR RHAB W/ECG: CPT

## 2018-11-05 ENCOUNTER — APPOINTMENT (OUTPATIENT)
Dept: CARDIAC REHAB | Age: 48
End: 2018-11-05
Payer: COMMERCIAL

## 2018-11-07 ENCOUNTER — HOSPITAL ENCOUNTER (OUTPATIENT)
Dept: CARDIAC REHAB | Age: 48
Setting detail: THERAPIES SERIES
Discharge: HOME | End: 2018-11-07
Payer: COMMERCIAL

## 2018-11-07 PROCEDURE — 93798 PHYS/QHP OP CAR RHAB W/ECG: CPT

## 2018-11-09 ENCOUNTER — HOSPITAL ENCOUNTER (OUTPATIENT)
Dept: CARDIAC REHAB | Age: 48
Setting detail: THERAPIES SERIES
Discharge: HOME | End: 2018-11-09
Payer: COMMERCIAL

## 2018-11-09 PROCEDURE — 93798 PHYS/QHP OP CAR RHAB W/ECG: CPT

## 2018-11-12 ENCOUNTER — HOSPITAL ENCOUNTER (OUTPATIENT)
Dept: CARDIAC REHAB | Age: 48
Setting detail: THERAPIES SERIES
Discharge: HOME | End: 2018-11-12
Payer: COMMERCIAL

## 2018-11-12 PROCEDURE — 93798 PHYS/QHP OP CAR RHAB W/ECG: CPT

## 2018-11-13 ENCOUNTER — OFFICE VISIT (OUTPATIENT)
Dept: PULMONOLOGY | Age: 48
End: 2018-11-13
Payer: COMMERCIAL

## 2018-11-13 VITALS
HEIGHT: 73 IN | BODY MASS INDEX: 26.37 KG/M2 | WEIGHT: 199 LBS | OXYGEN SATURATION: 98 % | SYSTOLIC BLOOD PRESSURE: 123 MMHG | HEART RATE: 71 BPM | DIASTOLIC BLOOD PRESSURE: 82 MMHG

## 2018-11-13 DIAGNOSIS — I10 ESSENTIAL HYPERTENSION: Chronic | ICD-10-CM

## 2018-11-13 DIAGNOSIS — I25.110 CORONARY ARTERY DISEASE INVOLVING NATIVE CORONARY ARTERY OF NATIVE HEART WITH UNSTABLE ANGINA PECTORIS (HCC): Chronic | ICD-10-CM

## 2018-11-13 DIAGNOSIS — F41.8 DEPRESSION WITH ANXIETY: Chronic | ICD-10-CM

## 2018-11-13 DIAGNOSIS — G47.33 OBSTRUCTIVE SLEEP APNEA SYNDROME: Chronic | ICD-10-CM

## 2018-11-13 PROCEDURE — G8598 ASA/ANTIPLAT THER USED: HCPCS | Performed by: INTERNAL MEDICINE

## 2018-11-13 PROCEDURE — G8419 CALC BMI OUT NRM PARAM NOF/U: HCPCS | Performed by: INTERNAL MEDICINE

## 2018-11-13 PROCEDURE — G8427 DOCREV CUR MEDS BY ELIG CLIN: HCPCS | Performed by: INTERNAL MEDICINE

## 2018-11-13 PROCEDURE — 1036F TOBACCO NON-USER: CPT | Performed by: INTERNAL MEDICINE

## 2018-11-13 PROCEDURE — G8484 FLU IMMUNIZE NO ADMIN: HCPCS | Performed by: INTERNAL MEDICINE

## 2018-11-13 PROCEDURE — 99214 OFFICE O/P EST MOD 30 MIN: CPT | Performed by: INTERNAL MEDICINE

## 2018-11-13 ASSESSMENT — SLEEP AND FATIGUE QUESTIONNAIRES
HOW LIKELY ARE YOU TO NOD OFF OR FALL ASLEEP WHILE LYING DOWN TO REST IN THE AFTERNOON WHEN CIRCUMSTANCES PERMIT: 2
HOW LIKELY ARE YOU TO NOD OFF OR FALL ASLEEP WHILE SITTING QUIETLY AFTER LUNCH WITHOUT ALCOHOL: 0
HOW LIKELY ARE YOU TO NOD OFF OR FALL ASLEEP WHILE SITTING AND READING: 0
HOW LIKELY ARE YOU TO NOD OFF OR FALL ASLEEP WHILE SITTING AND TALKING TO SOMEONE: 0
HOW LIKELY ARE YOU TO NOD OFF OR FALL ASLEEP IN A CAR, WHILE STOPPED FOR A FEW MINUTES IN TRAFFIC: 0
HOW LIKELY ARE YOU TO NOD OFF OR FALL ASLEEP WHILE WATCHING TV: 1
HOW LIKELY ARE YOU TO NOD OFF OR FALL ASLEEP WHEN YOU ARE A PASSENGER IN A CAR FOR AN HOUR WITHOUT A BREAK: 1
ESS TOTAL SCORE: 4
HOW LIKELY ARE YOU TO NOD OFF OR FALL ASLEEP WHILE SITTING INACTIVE IN A PUBLIC PLACE: 0

## 2018-11-13 ASSESSMENT — ENCOUNTER SYMPTOMS
CHEST TIGHTNESS: 0
EYE PAIN: 0
PHOTOPHOBIA: 0
STRIDOR: 0
SINUS PRESSURE: 0
SHORTNESS OF BREATH: 0

## 2018-11-13 NOTE — LETTER
[facility]  [address] [place] [address] [facility] [address]  Phone: [phone]  Fax: [phone]    November 13, 2018       Patient: Valery Hughes   MR Number: [id number]   YOB: 1970   Date of Visit: 11/13/2018       Valery Hughes was seen for a follow up visit today. Here is my assessment and plan as well as an attached copy of his visit today:    Obstructive sleep apnea syndrome  Chronic- Stable. Reviewed compliance download with pt. Supplies and parts as needed for his machine. These are medically necessary. Continue medications per his PCP and other physicians. Limit caffeine use after 3pm.  Will do trial increase in pressure to see if helps to keep mask on. May need to consider bilevel. Hypertension  Chronic- Stable. Cont meds per PCP and other physicians. Coronary artery disease involving native coronary artery of native heart with unstable angina pectoris (HCC)  Chronic- Stable. Cont meds per PCP and other physicians. Depression with anxiety  Chronic- Stable. Cont meds per PCP and other physicians. If you have questions or concerns, please do not hesitate to call me. I look forward to following Jonah Nicole along with you.     Sincerely,    Radha Benoit MD    CC providers:  Juana Pickett MD  [place] [id number] [address] [facility]

## 2018-11-14 ENCOUNTER — HOSPITAL ENCOUNTER (OUTPATIENT)
Dept: CARDIAC REHAB | Age: 48
Setting detail: THERAPIES SERIES
Discharge: HOME | End: 2018-11-14
Payer: COMMERCIAL

## 2018-11-14 PROCEDURE — 93798 PHYS/QHP OP CAR RHAB W/ECG: CPT

## 2018-11-16 ENCOUNTER — HOSPITAL ENCOUNTER (OUTPATIENT)
Dept: CARDIAC REHAB | Age: 48
Setting detail: THERAPIES SERIES
Discharge: HOME | End: 2018-11-16
Payer: COMMERCIAL

## 2018-11-16 PROCEDURE — 93798 PHYS/QHP OP CAR RHAB W/ECG: CPT

## 2018-11-19 ENCOUNTER — HOSPITAL ENCOUNTER (OUTPATIENT)
Dept: CARDIAC REHAB | Age: 48
Setting detail: THERAPIES SERIES
Discharge: HOME | End: 2018-11-19
Payer: COMMERCIAL

## 2018-11-19 PROCEDURE — 93798 PHYS/QHP OP CAR RHAB W/ECG: CPT

## 2018-11-21 ENCOUNTER — HOSPITAL ENCOUNTER (OUTPATIENT)
Dept: CARDIAC REHAB | Age: 48
Setting detail: THERAPIES SERIES
Discharge: HOME | End: 2018-11-21
Payer: COMMERCIAL

## 2018-11-21 PROCEDURE — 93798 PHYS/QHP OP CAR RHAB W/ECG: CPT

## 2018-11-23 ENCOUNTER — APPOINTMENT (OUTPATIENT)
Dept: CARDIAC REHAB | Age: 48
End: 2018-11-23
Payer: COMMERCIAL

## 2018-11-26 ENCOUNTER — HOSPITAL ENCOUNTER (OUTPATIENT)
Dept: CARDIAC REHAB | Age: 48
Setting detail: THERAPIES SERIES
Discharge: HOME | End: 2018-11-26
Payer: COMMERCIAL

## 2018-11-26 PROCEDURE — 93798 PHYS/QHP OP CAR RHAB W/ECG: CPT

## 2018-11-28 ENCOUNTER — HOSPITAL ENCOUNTER (OUTPATIENT)
Dept: CARDIAC REHAB | Age: 48
Setting detail: THERAPIES SERIES
Discharge: HOME | End: 2018-11-28
Payer: COMMERCIAL

## 2018-11-28 PROCEDURE — 93798 PHYS/QHP OP CAR RHAB W/ECG: CPT

## 2018-11-30 ENCOUNTER — HOSPITAL ENCOUNTER (OUTPATIENT)
Dept: CARDIAC REHAB | Age: 48
Setting detail: THERAPIES SERIES
Discharge: HOME | End: 2018-11-30
Payer: COMMERCIAL

## 2018-11-30 PROCEDURE — 93798 PHYS/QHP OP CAR RHAB W/ECG: CPT

## 2018-12-03 ENCOUNTER — APPOINTMENT (OUTPATIENT)
Dept: CARDIAC REHAB | Age: 48
End: 2018-12-03
Payer: COMMERCIAL

## 2018-12-04 ENCOUNTER — OFFICE VISIT (OUTPATIENT)
Dept: CARDIOLOGY CLINIC | Age: 48
End: 2018-12-04
Payer: COMMERCIAL

## 2018-12-04 VITALS
BODY MASS INDEX: 26.25 KG/M2 | HEART RATE: 82 BPM | OXYGEN SATURATION: 98 % | WEIGHT: 199 LBS | SYSTOLIC BLOOD PRESSURE: 118 MMHG | DIASTOLIC BLOOD PRESSURE: 78 MMHG

## 2018-12-04 DIAGNOSIS — I10 ESSENTIAL HYPERTENSION: ICD-10-CM

## 2018-12-04 DIAGNOSIS — Z98.61 S/P PTCA (PERCUTANEOUS TRANSLUMINAL CORONARY ANGIOPLASTY): ICD-10-CM

## 2018-12-04 DIAGNOSIS — I25.10 CORONARY ARTERY DISEASE INVOLVING NATIVE CORONARY ARTERY OF NATIVE HEART WITHOUT ANGINA PECTORIS: Primary | ICD-10-CM

## 2018-12-04 DIAGNOSIS — E78.2 MIXED HYPERLIPIDEMIA: ICD-10-CM

## 2018-12-04 PROCEDURE — G8484 FLU IMMUNIZE NO ADMIN: HCPCS | Performed by: NURSE PRACTITIONER

## 2018-12-04 PROCEDURE — 1036F TOBACCO NON-USER: CPT | Performed by: NURSE PRACTITIONER

## 2018-12-04 PROCEDURE — G8598 ASA/ANTIPLAT THER USED: HCPCS | Performed by: NURSE PRACTITIONER

## 2018-12-04 PROCEDURE — G8419 CALC BMI OUT NRM PARAM NOF/U: HCPCS | Performed by: NURSE PRACTITIONER

## 2018-12-04 PROCEDURE — 99214 OFFICE O/P EST MOD 30 MIN: CPT | Performed by: NURSE PRACTITIONER

## 2018-12-04 PROCEDURE — G8427 DOCREV CUR MEDS BY ELIG CLIN: HCPCS | Performed by: NURSE PRACTITIONER

## 2018-12-04 NOTE — PROGRESS NOTES
clots, jaundice or swollen lymph nodes. Endocrine:   No malaise/lethargy, palpitations, polydipsia/polyuria, temperature intolerance or unexpected weight changes  Skin:  No rashes or non-healing ulcers. Physical Exam:Blood pressure 118/78, pulse 82, weight 199 lb (90.3 kg), SpO2 98 %. General:  Alert and oriented. No acute distress. Appears comfortable. HEENT:  Normocephalic. No trauma. EOMI. Neck:  Supple, no JVD  Cardiovascular:  RRR  Pulses: 2+ right femoral   Lungs:  Clear to auscultation. No rales, wheezes, or rhonchi. Abd:  Soft, non-tender, non-distended. No peritoneal signs. Ext:  No clubbing, cyanosis, or edema. Neuro:  CN's 2-12 grossly in tact. Gait normal.  Motor and sensory exams grossly normal.  Skin:  No rashes or skin breakdown.     CBC:   Lab Results   Component Value Date    WBC 6.8 08/14/2018    HGB 14.4 08/14/2018    HCT 41.4 08/14/2018    MCV 92.8 08/14/2018     08/14/2018     BMP:  Lab Results   Component Value Date    CREATININE 0.9 09/26/2018    BUN 14 09/26/2018     09/26/2018    K 4.7 09/26/2018     09/26/2018    CO2 26 09/26/2018     Mag:   Lab Results   Component Value Date    MG 2.20 08/14/2018     LIVER PROFILE:   Lab Results   Component Value Date    ALT 53 (H) 09/26/2018    AST 41 (H) 09/26/2018    ALKPHOS 67 09/26/2018    BILITOT 0.8 09/26/2018     PT/INR:   Lab Results   Component Value Date    INR 1.06 08/13/2018    PROTIME 12.1 08/13/2018     BNP:  No results found for: BNP  LIPIDS:  No components found for: CHLPL  Lab Results   Component Value Date    TRIG 74 09/26/2018    TRIG 458 (H) 12/07/2017     Lab Results   Component Value Date    HDL 43 09/26/2018    HDL 33 (L) 12/07/2017     Lab Results   Component Value Date    LDLCALC 36 09/26/2018    LDLCALC see below 12/07/2017     Lab Results   Component Value Date    LABVLDL 15 09/26/2018    LABVLDL see below 12/07/2017     TSH:  Lab Results   Component Value Date    TSH 2.18 12/07/2017 are considered positive for ischemia. Medications:   Current Outpatient Prescriptions   Medication Sig Dispense Refill    tamsulosin (FLOMAX) 0.4 MG capsule Take 1 capsule by mouth daily 30 capsule 5    acetaminophen (TYLENOL) 325 MG tablet Take 2 tablets by mouth every 6 hours as needed for Pain or Fever 120 tablet 3    aspirin 81 MG chewable tablet Take 1 tablet by mouth daily 30 tablet 3    nitroGLYCERIN (NITROSTAT) 0.4 MG SL tablet up to max of 3 total doses. If no relief after 1 dose, call 911. 25 tablet 3    atorvastatin (LIPITOR) 80 MG tablet Take 1 tablet by mouth nightly 30 tablet 3    carvedilol (COREG) 3.125 MG tablet Take 1 tablet by mouth 2 times daily (with meals) 60 tablet 3    magnesium hydroxide (MILK OF MAGNESIA) 400 MG/5ML suspension Take 30 mLs by mouth daily as needed for Constipation      prasugrel (EFFIENT) 10 MG TABS Take 1 tablet by mouth daily 30 tablet 5    desvenlafaxine succinate (PRISTIQ) 25 MG TB24 extended release tablet Take 1 tablet by mouth daily 30 tablet 5    lisinopril (PRINIVIL;ZESTRIL) 10 MG tablet Take 1 tablet by mouth daily 30 tablet 5    fenofibrate 160 MG tablet TAKE 1 TABLET BY MOUTH DAILY 30 tablet 5    fenofibrate (TRICOR) 145 MG tablet Take 145 mg by mouth daily       No current facility-administered medications for this visit. Assessment:  1. Coronary artery disease involving native coronary artery of native heart without angina pectoris    2. Essential hypertension    3. Mixed hyperlipidemia    4.  S/P PTCA (percutaneous transluminal coronary angioplasty)        Plan:  -Cont asa, statin, fenofibrate, coreg, lisinopril and effient  -Follow up in 6 months

## 2018-12-05 ENCOUNTER — HOSPITAL ENCOUNTER (OUTPATIENT)
Dept: CARDIAC REHAB | Age: 48
Setting detail: THERAPIES SERIES
Discharge: HOME | End: 2018-12-05
Payer: COMMERCIAL

## 2018-12-05 PROCEDURE — 93798 PHYS/QHP OP CAR RHAB W/ECG: CPT

## 2018-12-07 ENCOUNTER — HOSPITAL ENCOUNTER (OUTPATIENT)
Dept: CARDIAC REHAB | Age: 48
Setting detail: THERAPIES SERIES
Discharge: HOME | End: 2018-12-07
Payer: COMMERCIAL

## 2018-12-07 PROCEDURE — 93798 PHYS/QHP OP CAR RHAB W/ECG: CPT

## 2018-12-10 ENCOUNTER — APPOINTMENT (OUTPATIENT)
Dept: CARDIAC REHAB | Age: 48
End: 2018-12-10
Payer: COMMERCIAL

## 2018-12-12 ENCOUNTER — HOSPITAL ENCOUNTER (OUTPATIENT)
Dept: CARDIAC REHAB | Age: 48
Setting detail: THERAPIES SERIES
Discharge: HOME | End: 2018-12-12
Payer: COMMERCIAL

## 2018-12-12 PROCEDURE — 93798 PHYS/QHP OP CAR RHAB W/ECG: CPT

## 2018-12-14 ENCOUNTER — PATIENT MESSAGE (OUTPATIENT)
Dept: CARDIOLOGY CLINIC | Age: 48
End: 2018-12-14

## 2018-12-14 ENCOUNTER — HOSPITAL ENCOUNTER (OUTPATIENT)
Dept: CARDIAC REHAB | Age: 48
Setting detail: THERAPIES SERIES
Discharge: HOME | End: 2018-12-14
Payer: COMMERCIAL

## 2018-12-14 PROCEDURE — 93798 PHYS/QHP OP CAR RHAB W/ECG: CPT

## 2018-12-17 ENCOUNTER — HOSPITAL ENCOUNTER (OUTPATIENT)
Dept: CARDIAC REHAB | Age: 48
Setting detail: THERAPIES SERIES
Discharge: HOME | End: 2018-12-17
Payer: COMMERCIAL

## 2018-12-17 PROCEDURE — 93798 PHYS/QHP OP CAR RHAB W/ECG: CPT

## 2018-12-17 RX ORDER — ATORVASTATIN CALCIUM 80 MG/1
80 TABLET, FILM COATED ORAL NIGHTLY
Qty: 90 TABLET | Refills: 3 | Status: SHIPPED | OUTPATIENT
Start: 2018-12-17 | End: 2019-12-29 | Stop reason: SDUPTHER

## 2018-12-17 RX ORDER — CARVEDILOL 3.12 MG/1
3.12 TABLET ORAL 2 TIMES DAILY WITH MEALS
Qty: 180 TABLET | Refills: 3 | Status: SHIPPED | OUTPATIENT
Start: 2018-12-17 | End: 2019-12-12 | Stop reason: SDUPTHER

## 2018-12-19 ENCOUNTER — APPOINTMENT (OUTPATIENT)
Dept: CARDIAC REHAB | Age: 48
End: 2018-12-19
Payer: COMMERCIAL

## 2018-12-21 ENCOUNTER — HOSPITAL ENCOUNTER (OUTPATIENT)
Dept: CARDIAC REHAB | Age: 48
Setting detail: THERAPIES SERIES
Discharge: HOME | End: 2018-12-21
Payer: COMMERCIAL

## 2018-12-21 PROCEDURE — 93798 PHYS/QHP OP CAR RHAB W/ECG: CPT

## 2018-12-26 ENCOUNTER — APPOINTMENT (OUTPATIENT)
Dept: CARDIAC REHAB | Age: 48
End: 2018-12-26
Payer: COMMERCIAL

## 2018-12-28 ENCOUNTER — APPOINTMENT (OUTPATIENT)
Dept: CARDIAC REHAB | Age: 48
End: 2018-12-28
Payer: COMMERCIAL

## 2018-12-31 ENCOUNTER — APPOINTMENT (OUTPATIENT)
Dept: CARDIAC REHAB | Age: 48
End: 2018-12-31
Payer: COMMERCIAL

## 2019-02-05 DIAGNOSIS — I10 ESSENTIAL HYPERTENSION: ICD-10-CM

## 2019-02-05 RX ORDER — LISINOPRIL 10 MG/1
10 TABLET ORAL DAILY
Qty: 30 TABLET | Refills: 0 | Status: SHIPPED | OUTPATIENT
Start: 2019-02-05 | End: 2019-03-13 | Stop reason: SDUPTHER

## 2019-03-12 ENCOUNTER — OFFICE VISIT (OUTPATIENT)
Dept: PULMONOLOGY | Age: 49
End: 2019-03-12
Payer: COMMERCIAL

## 2019-03-12 VITALS
HEIGHT: 73 IN | DIASTOLIC BLOOD PRESSURE: 72 MMHG | WEIGHT: 197 LBS | HEART RATE: 68 BPM | SYSTOLIC BLOOD PRESSURE: 121 MMHG | BODY MASS INDEX: 26.11 KG/M2 | OXYGEN SATURATION: 98 %

## 2019-03-12 DIAGNOSIS — I10 ESSENTIAL HYPERTENSION: Chronic | ICD-10-CM

## 2019-03-12 DIAGNOSIS — I25.110 CORONARY ARTERY DISEASE INVOLVING NATIVE CORONARY ARTERY OF NATIVE HEART WITH UNSTABLE ANGINA PECTORIS (HCC): Chronic | ICD-10-CM

## 2019-03-12 DIAGNOSIS — F41.8 DEPRESSION WITH ANXIETY: Chronic | ICD-10-CM

## 2019-03-12 DIAGNOSIS — G47.33 OBSTRUCTIVE SLEEP APNEA SYNDROME: Chronic | ICD-10-CM

## 2019-03-12 PROCEDURE — G8427 DOCREV CUR MEDS BY ELIG CLIN: HCPCS | Performed by: INTERNAL MEDICINE

## 2019-03-12 PROCEDURE — G8484 FLU IMMUNIZE NO ADMIN: HCPCS | Performed by: INTERNAL MEDICINE

## 2019-03-12 PROCEDURE — 1036F TOBACCO NON-USER: CPT | Performed by: INTERNAL MEDICINE

## 2019-03-12 PROCEDURE — 99213 OFFICE O/P EST LOW 20 MIN: CPT | Performed by: INTERNAL MEDICINE

## 2019-03-12 PROCEDURE — G8419 CALC BMI OUT NRM PARAM NOF/U: HCPCS | Performed by: INTERNAL MEDICINE

## 2019-03-12 PROCEDURE — G8599 NO ASA/ANTIPLAT THER USE RNG: HCPCS | Performed by: INTERNAL MEDICINE

## 2019-03-12 ASSESSMENT — SLEEP AND FATIGUE QUESTIONNAIRES
HOW LIKELY ARE YOU TO NOD OFF OR FALL ASLEEP WHILE WATCHING TV: 1
HOW LIKELY ARE YOU TO NOD OFF OR FALL ASLEEP IN A CAR, WHILE STOPPED FOR A FEW MINUTES IN TRAFFIC: 0
HOW LIKELY ARE YOU TO NOD OFF OR FALL ASLEEP WHILE SITTING AND TALKING TO SOMEONE: 0
HOW LIKELY ARE YOU TO NOD OFF OR FALL ASLEEP WHILE SITTING INACTIVE IN A PUBLIC PLACE: 0
HOW LIKELY ARE YOU TO NOD OFF OR FALL ASLEEP WHILE SITTING AND READING: 1
HOW LIKELY ARE YOU TO NOD OFF OR FALL ASLEEP WHEN YOU ARE A PASSENGER IN A CAR FOR AN HOUR WITHOUT A BREAK: 2
HOW LIKELY ARE YOU TO NOD OFF OR FALL ASLEEP WHILE SITTING QUIETLY AFTER LUNCH WITHOUT ALCOHOL: 0
HOW LIKELY ARE YOU TO NOD OFF OR FALL ASLEEP WHILE LYING DOWN TO REST IN THE AFTERNOON WHEN CIRCUMSTANCES PERMIT: 2
ESS TOTAL SCORE: 6

## 2019-03-12 ASSESSMENT — ENCOUNTER SYMPTOMS
PHOTOPHOBIA: 0
NAUSEA: 0
APNEA: 1
CHEST TIGHTNESS: 0
STRIDOR: 0
ABDOMINAL DISTENTION: 0
SINUS PRESSURE: 0
EYE PAIN: 0
SHORTNESS OF BREATH: 0
ABDOMINAL PAIN: 0

## 2019-03-13 ENCOUNTER — OFFICE VISIT (OUTPATIENT)
Dept: CARDIOLOGY CLINIC | Age: 49
End: 2019-03-13
Payer: COMMERCIAL

## 2019-03-13 VITALS
DIASTOLIC BLOOD PRESSURE: 66 MMHG | WEIGHT: 197.2 LBS | HEART RATE: 65 BPM | BODY MASS INDEX: 26.02 KG/M2 | SYSTOLIC BLOOD PRESSURE: 112 MMHG

## 2019-03-13 DIAGNOSIS — I10 ESSENTIAL HYPERTENSION: ICD-10-CM

## 2019-03-13 DIAGNOSIS — Z98.61 S/P PTCA (PERCUTANEOUS TRANSLUMINAL CORONARY ANGIOPLASTY): ICD-10-CM

## 2019-03-13 DIAGNOSIS — E78.2 MIXED HYPERLIPIDEMIA: ICD-10-CM

## 2019-03-13 DIAGNOSIS — I25.10 CORONARY ARTERY DISEASE INVOLVING NATIVE CORONARY ARTERY OF NATIVE HEART WITHOUT ANGINA PECTORIS: Primary | ICD-10-CM

## 2019-03-13 PROCEDURE — 1036F TOBACCO NON-USER: CPT | Performed by: INTERNAL MEDICINE

## 2019-03-13 PROCEDURE — 99213 OFFICE O/P EST LOW 20 MIN: CPT | Performed by: INTERNAL MEDICINE

## 2019-03-13 PROCEDURE — G8484 FLU IMMUNIZE NO ADMIN: HCPCS | Performed by: INTERNAL MEDICINE

## 2019-03-13 PROCEDURE — G8599 NO ASA/ANTIPLAT THER USE RNG: HCPCS | Performed by: INTERNAL MEDICINE

## 2019-03-13 PROCEDURE — G8428 CUR MEDS NOT DOCUMENT: HCPCS | Performed by: INTERNAL MEDICINE

## 2019-03-13 PROCEDURE — G8419 CALC BMI OUT NRM PARAM NOF/U: HCPCS | Performed by: INTERNAL MEDICINE

## 2019-03-13 RX ORDER — LISINOPRIL 10 MG/1
10 TABLET ORAL DAILY
Qty: 90 TABLET | Refills: 0 | Status: SHIPPED | OUTPATIENT
Start: 2019-03-13 | End: 2019-06-13 | Stop reason: SDUPTHER

## 2019-03-13 RX ORDER — LISINOPRIL 10 MG/1
10 TABLET ORAL DAILY
Qty: 30 TABLET | Refills: 0 | Status: SHIPPED | OUTPATIENT
Start: 2019-03-13 | End: 2019-03-13 | Stop reason: SDUPTHER

## 2019-03-18 ENCOUNTER — TELEPHONE (OUTPATIENT)
Dept: CARDIOLOGY CLINIC | Age: 49
End: 2019-03-18

## 2019-05-21 ENCOUNTER — TELEPHONE (OUTPATIENT)
Dept: CARDIOLOGY CLINIC | Age: 49
End: 2019-05-21

## 2019-05-21 NOTE — TELEPHONE ENCOUNTER
That shouldn't happen. Make sure he tired the co-pay card. Angel Claros can you please look into this. Call the representative if need be.

## 2019-05-21 NOTE — TELEPHONE ENCOUNTER
Spoke with pt. Pt has been getting this for months not sure what is different. Pt is going to contact his pharmacy to see what the issue is. Will await a cb.

## 2019-06-13 DIAGNOSIS — I10 ESSENTIAL HYPERTENSION: ICD-10-CM

## 2019-06-13 RX ORDER — LISINOPRIL 10 MG/1
10 TABLET ORAL DAILY
Qty: 90 TABLET | Refills: 1 | Status: SHIPPED | OUTPATIENT
Start: 2019-06-13 | End: 2019-12-12 | Stop reason: SDUPTHER

## 2019-06-21 NOTE — TELEPHONE ENCOUNTER
Requested Prescriptions     Pending Prescriptions Disp Refills    ticagrelor (BRILINTA) 90 MG TABS tablet 60 tablet 5     Sig: Take 1 tablet by mouth 2 times daily     Last Labs: 9/26/18 (Lipid, CMP)    Last OV: 3/13/2019    Next OV: 9/18/19 w/JAR    Last Fill: See Telephone encounter 5/21/19

## 2019-07-08 PROCEDURE — 9900000038 HC CARDIAC REHAB PHASE 3 - MONTHLY

## 2019-07-16 ENCOUNTER — PATIENT MESSAGE (OUTPATIENT)
Dept: FAMILY MEDICINE CLINIC | Age: 49
End: 2019-07-16

## 2019-07-17 RX ORDER — DESVENLAFAXINE 50 MG/1
50 TABLET, EXTENDED RELEASE ORAL DAILY
Qty: 90 TABLET | Refills: 1 | Status: SHIPPED | OUTPATIENT
Start: 2019-07-17 | End: 2020-01-27

## 2019-07-17 RX ORDER — DESVENLAFAXINE 50 MG/1
50 TABLET, EXTENDED RELEASE ORAL DAILY
Qty: 30 TABLET | Refills: 5 | Status: SHIPPED | OUTPATIENT
Start: 2019-07-17 | End: 2019-07-17 | Stop reason: SDUPTHER

## 2019-07-17 NOTE — TELEPHONE ENCOUNTER
From: Andria Brandon  To: Trudi Andrew MD  Sent: 7/16/2019 7:51 PM EDT  Subject: Prescription Question    Dr. Schneider Safe-    Can I request a prescription modification on my Pristiq to the 50 mg dosage from the 25 mg dosage. I have been taking the low dosage to minimize side effects, but lately have had an increase in panic attacks, depression, etc. I think I may need to get back on the full dose for a time to deal with the increased symptoms.     Thanks,  Sivakumar

## 2019-07-29 ENCOUNTER — HOSPITAL ENCOUNTER (OUTPATIENT)
Dept: CARDIAC REHAB | Age: 49
Discharge: HOME | End: 2019-07-29

## 2019-08-19 ENCOUNTER — OFFICE VISIT (OUTPATIENT)
Dept: PULMONOLOGY | Age: 49
End: 2019-08-19
Payer: COMMERCIAL

## 2019-08-19 VITALS
SYSTOLIC BLOOD PRESSURE: 122 MMHG | HEART RATE: 76 BPM | DIASTOLIC BLOOD PRESSURE: 80 MMHG | OXYGEN SATURATION: 98 % | HEIGHT: 73 IN | BODY MASS INDEX: 26.51 KG/M2 | WEIGHT: 200 LBS

## 2019-08-19 DIAGNOSIS — I10 ESSENTIAL HYPERTENSION: Chronic | ICD-10-CM

## 2019-08-19 DIAGNOSIS — I25.110 CORONARY ARTERY DISEASE INVOLVING NATIVE CORONARY ARTERY OF NATIVE HEART WITH UNSTABLE ANGINA PECTORIS (HCC): Chronic | ICD-10-CM

## 2019-08-19 DIAGNOSIS — G47.33 OBSTRUCTIVE SLEEP APNEA SYNDROME: Chronic | ICD-10-CM

## 2019-08-19 DIAGNOSIS — F41.8 DEPRESSION WITH ANXIETY: Chronic | ICD-10-CM

## 2019-08-19 PROCEDURE — G8599 NO ASA/ANTIPLAT THER USE RNG: HCPCS | Performed by: INTERNAL MEDICINE

## 2019-08-19 PROCEDURE — G8419 CALC BMI OUT NRM PARAM NOF/U: HCPCS | Performed by: INTERNAL MEDICINE

## 2019-08-19 PROCEDURE — G8427 DOCREV CUR MEDS BY ELIG CLIN: HCPCS | Performed by: INTERNAL MEDICINE

## 2019-08-19 PROCEDURE — 99214 OFFICE O/P EST MOD 30 MIN: CPT | Performed by: INTERNAL MEDICINE

## 2019-08-19 PROCEDURE — 1036F TOBACCO NON-USER: CPT | Performed by: INTERNAL MEDICINE

## 2019-08-19 ASSESSMENT — SLEEP AND FATIGUE QUESTIONNAIRES
HOW LIKELY ARE YOU TO NOD OFF OR FALL ASLEEP IN A CAR, WHILE STOPPED FOR A FEW MINUTES IN TRAFFIC: 0
HOW LIKELY ARE YOU TO NOD OFF OR FALL ASLEEP WHILE SITTING QUIETLY AFTER LUNCH WITHOUT ALCOHOL: 0
HOW LIKELY ARE YOU TO NOD OFF OR FALL ASLEEP WHILE LYING DOWN TO REST IN THE AFTERNOON WHEN CIRCUMSTANCES PERMIT: 1
HOW LIKELY ARE YOU TO NOD OFF OR FALL ASLEEP WHILE SITTING INACTIVE IN A PUBLIC PLACE: 0
HOW LIKELY ARE YOU TO NOD OFF OR FALL ASLEEP WHILE SITTING AND TALKING TO SOMEONE: 0
HOW LIKELY ARE YOU TO NOD OFF OR FALL ASLEEP WHEN YOU ARE A PASSENGER IN A CAR FOR AN HOUR WITHOUT A BREAK: 0
HOW LIKELY ARE YOU TO NOD OFF OR FALL ASLEEP WHILE WATCHING TV: 1
ESS TOTAL SCORE: 3
HOW LIKELY ARE YOU TO NOD OFF OR FALL ASLEEP WHILE SITTING AND READING: 1

## 2019-08-19 ASSESSMENT — ENCOUNTER SYMPTOMS
SINUS PRESSURE: 0
EYE PAIN: 0
STRIDOR: 0
PHOTOPHOBIA: 0
CHEST TIGHTNESS: 0
SHORTNESS OF BREATH: 0

## 2019-08-19 NOTE — ASSESSMENT & PLAN NOTE
Chronic-worsening:  Continue medications per his PCP and other physicians. Instructed not to drive unless had 4 hrs of effective therapy for his BUSHRA the night before. Did review the risks of under or untreated BUSHRA including, but not limited to, higher risks of motor vehicle accidents, stroke, heart attacks, and death. He understands and accepts all these risks. Will continue with CPAP for now, needs to use his machine each and every night, all night. 4 months f/u.

## 2019-09-09 PROCEDURE — 9900000038 HC CARDIAC REHAB PHASE 3 - MONTHLY

## 2019-09-18 ENCOUNTER — OFFICE VISIT (OUTPATIENT)
Dept: CARDIOLOGY CLINIC | Age: 49
End: 2019-09-18
Payer: COMMERCIAL

## 2019-09-18 VITALS
HEART RATE: 83 BPM | SYSTOLIC BLOOD PRESSURE: 122 MMHG | OXYGEN SATURATION: 98 % | DIASTOLIC BLOOD PRESSURE: 86 MMHG | BODY MASS INDEX: 26.52 KG/M2 | WEIGHT: 201 LBS

## 2019-09-18 DIAGNOSIS — E78.2 MIXED HYPERLIPIDEMIA: ICD-10-CM

## 2019-09-18 DIAGNOSIS — I10 ESSENTIAL HYPERTENSION: ICD-10-CM

## 2019-09-18 DIAGNOSIS — Z98.61 S/P PTCA (PERCUTANEOUS TRANSLUMINAL CORONARY ANGIOPLASTY): ICD-10-CM

## 2019-09-18 DIAGNOSIS — I25.10 CORONARY ARTERY DISEASE INVOLVING NATIVE CORONARY ARTERY OF NATIVE HEART WITHOUT ANGINA PECTORIS: Primary | ICD-10-CM

## 2019-09-18 PROCEDURE — 93000 ELECTROCARDIOGRAM COMPLETE: CPT | Performed by: INTERNAL MEDICINE

## 2019-09-18 PROCEDURE — G8598 ASA/ANTIPLAT THER USED: HCPCS | Performed by: INTERNAL MEDICINE

## 2019-09-18 PROCEDURE — G8419 CALC BMI OUT NRM PARAM NOF/U: HCPCS | Performed by: INTERNAL MEDICINE

## 2019-09-18 PROCEDURE — G8427 DOCREV CUR MEDS BY ELIG CLIN: HCPCS | Performed by: INTERNAL MEDICINE

## 2019-09-18 PROCEDURE — 1036F TOBACCO NON-USER: CPT | Performed by: INTERNAL MEDICINE

## 2019-09-18 PROCEDURE — 99214 OFFICE O/P EST MOD 30 MIN: CPT | Performed by: INTERNAL MEDICINE

## 2019-09-18 RX ORDER — CLOPIDOGREL BISULFATE 75 MG/1
75 TABLET ORAL DAILY
Qty: 90 TABLET | Refills: 3 | Status: SHIPPED | OUTPATIENT
Start: 2019-09-18 | End: 2020-06-12 | Stop reason: SDUPTHER

## 2019-09-18 NOTE — PROGRESS NOTES
51 y.o. with CAD here for f/u. Had LAD/RCA CONRAD in 8/2018. HPI/Update: Periodically sob, particularly when exerting himself in heat. No chest pain, and that was his anginal symptom. No n/v/LH/dizziness. No syncope. Still does cardiac rehab, no symptoms when doing that. No LE edema. No orthopnea or PND. \"Bruises like crazy. \"      Past Medical History:   Diagnosis Date    Benign prostatic hyperplasia with urinary hesitancy 9/26/2018    CAD (coronary artery disease) 08/13/2018     PCI LAD with 2.75 x 12 mm Xience [place] and Mid right PDA with 2.25x 12 mm Xience [place] CONRAD    Chronic rhinitis 3/26/2018    Depression with anxiety 10/3/2014    Essential hypertension 9/23/2016    Hypertriglyceridemia 8/10/2018    Obstructive sleep apnea syndrome     Umbilical hernia      Past Surgical History:   Procedure Laterality Date    CORONARY ANGIOPLASTY WITH STENT PLACEMENT  08/13/2018    x 2    INGUINAL HERNIA REPAIR      UMBILICAL HERNIA REPAIR       Social History     Tobacco Use    Smoking status: Never Smoker    Smokeless tobacco: Never Used   Substance Use Topics    Alcohol use: Yes     Comment: rarely    Drug use: Not on file     Allergies   Allergen Reactions    No Known Drug Allergy      Family History   Problem Relation Age of Onset    Hypertension Other      Review of Systems   General: No fevers, chills, fatigue, or night sweats. No abnormal changes in weight. HEENT: No blurry or decreased vision. No changes in hearing, nasal discharge or sore throat. Cardiovascular: See HPI. No cramping in legs or buttocks when walking. Respiratory: No cough, hemoptysis, or wheezing. No history of asthma. Gastrointestinal: No abdominal pain, hematochezia, melana, or history of GI ulcers. Genito-Urinary: No dysuria or hematuria. No urgency or polyuria. Musculoskeletal: No complaints of joint pain, joint swelling or muscular weakness/soreness. Neurological: No dizziness or headaches.   No

## 2019-09-30 ENCOUNTER — HOSPITAL ENCOUNTER (OUTPATIENT)
Dept: CARDIAC REHAB | Age: 49
Discharge: HOME | End: 2019-09-30

## 2019-10-02 PROCEDURE — 9900000038 HC CARDIAC REHAB PHASE 3 - MONTHLY

## 2019-10-16 DIAGNOSIS — I25.10 CORONARY ARTERY DISEASE INVOLVING NATIVE CORONARY ARTERY OF NATIVE HEART WITHOUT ANGINA PECTORIS: ICD-10-CM

## 2019-10-16 LAB
CHOLESTEROL, TOTAL: 120 MG/DL (ref 0–199)
HDLC SERPL-MCNC: 51 MG/DL (ref 40–60)
LDL CHOLESTEROL CALCULATED: 48 MG/DL
TRIGL SERPL-MCNC: 106 MG/DL (ref 0–150)
VLDLC SERPL CALC-MCNC: 21 MG/DL

## 2019-10-28 ENCOUNTER — HOSPITAL ENCOUNTER (OUTPATIENT)
Dept: CARDIAC REHAB | Age: 49
Discharge: HOME | End: 2019-10-28

## 2019-11-04 PROCEDURE — 9900000038 HC CARDIAC REHAB PHASE 3 - MONTHLY

## 2019-11-25 ENCOUNTER — HOSPITAL ENCOUNTER (OUTPATIENT)
Dept: CARDIAC REHAB | Age: 49
Discharge: HOME | End: 2019-11-25

## 2019-12-12 DIAGNOSIS — I10 ESSENTIAL HYPERTENSION: ICD-10-CM

## 2019-12-14 RX ORDER — LISINOPRIL 10 MG/1
10 TABLET ORAL DAILY
Qty: 90 TABLET | Refills: 0 | Status: SHIPPED | OUTPATIENT
Start: 2019-12-14 | End: 2020-04-07

## 2019-12-16 ENCOUNTER — OFFICE VISIT (OUTPATIENT)
Dept: PULMONOLOGY | Age: 49
End: 2019-12-16
Payer: COMMERCIAL

## 2019-12-16 VITALS
DIASTOLIC BLOOD PRESSURE: 84 MMHG | BODY MASS INDEX: 27.17 KG/M2 | HEART RATE: 70 BPM | HEIGHT: 73 IN | OXYGEN SATURATION: 97 % | WEIGHT: 205 LBS | SYSTOLIC BLOOD PRESSURE: 122 MMHG

## 2019-12-16 DIAGNOSIS — I10 ESSENTIAL HYPERTENSION: Chronic | ICD-10-CM

## 2019-12-16 DIAGNOSIS — G47.33 OBSTRUCTIVE SLEEP APNEA SYNDROME: Chronic | ICD-10-CM

## 2019-12-16 DIAGNOSIS — I25.110 CORONARY ARTERY DISEASE INVOLVING NATIVE CORONARY ARTERY OF NATIVE HEART WITH UNSTABLE ANGINA PECTORIS (HCC): Chronic | ICD-10-CM

## 2019-12-16 DIAGNOSIS — F41.8 DEPRESSION WITH ANXIETY: Chronic | ICD-10-CM

## 2019-12-16 PROCEDURE — G8419 CALC BMI OUT NRM PARAM NOF/U: HCPCS | Performed by: INTERNAL MEDICINE

## 2019-12-16 PROCEDURE — 99214 OFFICE O/P EST MOD 30 MIN: CPT | Performed by: INTERNAL MEDICINE

## 2019-12-16 PROCEDURE — G8427 DOCREV CUR MEDS BY ELIG CLIN: HCPCS | Performed by: INTERNAL MEDICINE

## 2019-12-16 PROCEDURE — G8598 ASA/ANTIPLAT THER USED: HCPCS | Performed by: INTERNAL MEDICINE

## 2019-12-16 PROCEDURE — 1036F TOBACCO NON-USER: CPT | Performed by: INTERNAL MEDICINE

## 2019-12-16 PROCEDURE — G8484 FLU IMMUNIZE NO ADMIN: HCPCS | Performed by: INTERNAL MEDICINE

## 2019-12-16 ASSESSMENT — SLEEP AND FATIGUE QUESTIONNAIRES
HOW LIKELY ARE YOU TO NOD OFF OR FALL ASLEEP WHILE LYING DOWN TO REST IN THE AFTERNOON WHEN CIRCUMSTANCES PERMIT: 2
HOW LIKELY ARE YOU TO NOD OFF OR FALL ASLEEP WHILE WATCHING TV: 1
ESS TOTAL SCORE: 4
HOW LIKELY ARE YOU TO NOD OFF OR FALL ASLEEP IN A CAR, WHILE STOPPED FOR A FEW MINUTES IN TRAFFIC: 0
HOW LIKELY ARE YOU TO NOD OFF OR FALL ASLEEP WHILE SITTING QUIETLY AFTER LUNCH WITHOUT ALCOHOL: 0
HOW LIKELY ARE YOU TO NOD OFF OR FALL ASLEEP WHILE SITTING AND TALKING TO SOMEONE: 0
HOW LIKELY ARE YOU TO NOD OFF OR FALL ASLEEP WHILE SITTING AND READING: 0
HOW LIKELY ARE YOU TO NOD OFF OR FALL ASLEEP WHEN YOU ARE A PASSENGER IN A CAR FOR AN HOUR WITHOUT A BREAK: 1
HOW LIKELY ARE YOU TO NOD OFF OR FALL ASLEEP WHILE SITTING INACTIVE IN A PUBLIC PLACE: 0

## 2019-12-16 ASSESSMENT — ENCOUNTER SYMPTOMS
STRIDOR: 0
SHORTNESS OF BREATH: 0
EYE PAIN: 0
SINUS PRESSURE: 0
PHOTOPHOBIA: 0
APNEA: 1
CHEST TIGHTNESS: 0
ABDOMINAL PAIN: 0
ABDOMINAL DISTENTION: 0
NAUSEA: 0

## 2019-12-30 RX ORDER — ATORVASTATIN CALCIUM 80 MG/1
80 TABLET, FILM COATED ORAL NIGHTLY
Qty: 90 TABLET | Refills: 3 | Status: SHIPPED | OUTPATIENT
Start: 2019-12-30 | End: 2020-06-12 | Stop reason: SDUPTHER

## 2020-02-17 ENCOUNTER — OFFICE VISIT (OUTPATIENT)
Dept: FAMILY MEDICINE CLINIC | Age: 50
End: 2020-02-17
Payer: COMMERCIAL

## 2020-02-17 VITALS
DIASTOLIC BLOOD PRESSURE: 84 MMHG | OXYGEN SATURATION: 96 % | WEIGHT: 209.5 LBS | HEART RATE: 67 BPM | BODY MASS INDEX: 27.64 KG/M2 | RESPIRATION RATE: 14 BRPM | TEMPERATURE: 97.2 F | SYSTOLIC BLOOD PRESSURE: 128 MMHG

## 2020-02-17 PROCEDURE — G8484 FLU IMMUNIZE NO ADMIN: HCPCS | Performed by: FAMILY MEDICINE

## 2020-02-17 PROCEDURE — 99214 OFFICE O/P EST MOD 30 MIN: CPT | Performed by: FAMILY MEDICINE

## 2020-02-17 PROCEDURE — G8427 DOCREV CUR MEDS BY ELIG CLIN: HCPCS | Performed by: FAMILY MEDICINE

## 2020-02-17 PROCEDURE — 1036F TOBACCO NON-USER: CPT | Performed by: FAMILY MEDICINE

## 2020-02-17 PROCEDURE — G8419 CALC BMI OUT NRM PARAM NOF/U: HCPCS | Performed by: FAMILY MEDICINE

## 2020-02-17 RX ORDER — LORAZEPAM 0.5 MG/1
0.5 TABLET ORAL EVERY 8 HOURS PRN
Qty: 30 TABLET | Refills: 0 | Status: SHIPPED | OUTPATIENT
Start: 2020-02-17 | End: 2020-03-18

## 2020-02-17 RX ORDER — DESVENLAFAXINE 50 MG/1
50 TABLET, EXTENDED RELEASE ORAL DAILY
Qty: 30 TABLET | Refills: 5 | Status: SHIPPED | OUTPATIENT
Start: 2020-02-17 | End: 2020-09-02

## 2020-02-25 NOTE — PROGRESS NOTES
Notify pt that I discussed his plavix usage with dr. Dalia Bobby. He feels that with young age and that he is tolerating plavix well, would recommend continuing.   I think that is fine and would be ok for him to continue as well    thanks
Current Outpatient Medications   Medication Sig Dispense Refill    desvenlafaxine succinate (PRISTIQ) 50 MG TB24 extended release tablet Take 1 tablet by mouth daily 30 tablet 5    LORazepam (ATIVAN) 0.5 MG tablet Take 1 tablet by mouth every 8 hours as needed for Anxiety for up to 30 days. 30 tablet 0    atorvastatin (LIPITOR) 80 MG tablet Take 1 tablet by mouth nightly 90 tablet 3    lisinopril (PRINIVIL;ZESTRIL) 10 MG tablet TAKE 1 TABLET BY MOUTH DAILY 90 tablet 0    carvedilol (COREG) 3.125 MG tablet TAKE 1 TABLET BY MOUTH TWICE DAILY WITH MEALS 180 tablet 2    clopidogrel (PLAVIX) 75 MG tablet Take 1 tablet by mouth daily 90 tablet 3    tamsulosin (FLOMAX) 0.4 MG capsule TAKE 1 CAPSULE BY MOUTH DAILY 30 capsule 5    aspirin 81 MG chewable tablet Take 1 tablet by mouth daily 30 tablet 3     No current facility-administered medications for this visit. Orders Only on 10/16/2019   Component Date Value Ref Range Status    Cholesterol, Total 10/16/2019 120  0 - 199 mg/dL Final    Triglycerides 10/16/2019 106  0 - 150 mg/dL Final    HDL 10/16/2019 51  40 - 60 mg/dL Final    LDL Calculated 10/16/2019 48  <100 mg/dL Final    VLDL Cholesterol Calculated 10/16/2019 21  Not Established mg/dL Final           ASSESSMENT / PLAN:    1. Coronary artery disease involving native coronary artery of native heart with unstable angina pectoris (HCC)  Stable w/o sx  Has been on plavix for > 1 year, will d/w cardiology option of d/c of plavix  ? Benefit of low-dose xarelto 2.5mg BID for risk reduction  Will d/w dr. Mary Kate Villavicencio    2. Panic attack  Doing great with pristiq  Continue present management. Cont prn ativan, use very rare  See [facility]  Pt aware of need for every 3 month medication followup appointments, and that medication refills for benzodiazepines, narcotics and/or stimulants will only be given at appointment.   - LORazepam (ATIVAN) 0.5 MG tablet;  Take 1 tablet by mouth every 8 hours as needed for

## 2020-03-02 ENCOUNTER — TELEPHONE (OUTPATIENT)
Dept: FAMILY MEDICINE CLINIC | Age: 50
End: 2020-03-02

## 2020-03-02 NOTE — TELEPHONE ENCOUNTER
Pt advised of pcp message. Notify pt that I discussed his plavix usage with dr. Adore Millard. He feels that with young age and that he is tolerating plavix well, would recommend continuing.   I think that is fine and would be ok for him to continue as well     thanks

## 2020-03-02 NOTE — TELEPHONE ENCOUNTER
----- Message from Savanna Rich MD sent at 2/25/2020  7:08 AM EST -----      ----- Message -----  From: Shandra Miguel MD  Sent: 2/24/2020   1:46 PM EST  To: Savanna Rich MD    I have used the Xarelto 2.5 mg dose for PAD and CAD patients but not just for CAD. I'm ok stopping plavix, but there is a small group of patient's who thrombose stents when the second agent is stopped, and we just don't know who they are. So, guidelines suggest it's fine to stop plavix (and continue asa 81 mg) after 1 year, but I just generally leave it on unless there is an issue with it. If people want to stop it, I don't fight them. But, generally, particularly in younger patients, I leave it on. Thanks,  Fabrizio Wilson  ----- Message -----  From: Savanna Rich MD  Sent: 2/17/2020   3:42 PM EST  To: MD Cecilio Epperson. Saw Mr. Marisela Martínez in followup today. Doing ok with some mild persistent anxiety. His stents were placed 8/2018. Is there new data to lengthen need for plavix, or a reason for him to be on longer? Also, what are your thoughts on the new data of Xarelto 2.5mg BID for long-term use for heart protection s/p CAD.   Smith about it but haven't seen it being used much    Thanks BP

## 2020-04-07 RX ORDER — LISINOPRIL 10 MG/1
10 TABLET ORAL DAILY
Qty: 90 TABLET | Refills: 0 | Status: SHIPPED | OUTPATIENT
Start: 2020-04-07 | End: 2020-06-12 | Stop reason: SDUPTHER

## 2020-06-12 ENCOUNTER — OFFICE VISIT (OUTPATIENT)
Dept: CARDIOLOGY CLINIC | Age: 50
End: 2020-06-12
Payer: COMMERCIAL

## 2020-06-12 VITALS
WEIGHT: 214.2 LBS | SYSTOLIC BLOOD PRESSURE: 130 MMHG | HEART RATE: 82 BPM | HEIGHT: 73 IN | BODY MASS INDEX: 28.39 KG/M2 | DIASTOLIC BLOOD PRESSURE: 78 MMHG

## 2020-06-12 DIAGNOSIS — R39.11 BENIGN PROSTATIC HYPERPLASIA WITH URINARY HESITANCY: ICD-10-CM

## 2020-06-12 DIAGNOSIS — N40.1 BENIGN PROSTATIC HYPERPLASIA WITH URINARY HESITANCY: ICD-10-CM

## 2020-06-12 DIAGNOSIS — I10 ESSENTIAL HYPERTENSION: ICD-10-CM

## 2020-06-12 LAB
A/G RATIO: 2.4 (ref 1.1–2.2)
ALBUMIN SERPL-MCNC: 4.7 G/DL (ref 3.4–5)
ALP BLD-CCNC: 65 U/L (ref 40–129)
ALT SERPL-CCNC: 42 U/L (ref 10–40)
ANION GAP SERPL CALCULATED.3IONS-SCNC: 11 MMOL/L (ref 3–16)
AST SERPL-CCNC: 27 U/L (ref 15–37)
BILIRUB SERPL-MCNC: 0.5 MG/DL (ref 0–1)
BUN BLDV-MCNC: 18 MG/DL (ref 7–20)
CALCIUM SERPL-MCNC: 9.1 MG/DL (ref 8.3–10.6)
CHLORIDE BLD-SCNC: 104 MMOL/L (ref 99–110)
CO2: 26 MMOL/L (ref 21–32)
CREAT SERPL-MCNC: 0.9 MG/DL (ref 0.9–1.3)
GFR AFRICAN AMERICAN: >60
GFR NON-AFRICAN AMERICAN: >60
GLOBULIN: 2 G/DL
GLUCOSE BLD-MCNC: 130 MG/DL (ref 70–99)
HCT VFR BLD CALC: 42.9 % (ref 40.5–52.5)
HEMOGLOBIN: 14.7 G/DL (ref 13.5–17.5)
MCH RBC QN AUTO: 32.6 PG (ref 26–34)
MCHC RBC AUTO-ENTMCNC: 34.3 G/DL (ref 31–36)
MCV RBC AUTO: 95.1 FL (ref 80–100)
PDW BLD-RTO: 12.6 % (ref 12.4–15.4)
PLATELET # BLD: 142 K/UL (ref 135–450)
PMV BLD AUTO: 10.7 FL (ref 5–10.5)
POTASSIUM SERPL-SCNC: 4.3 MMOL/L (ref 3.5–5.1)
PROSTATE SPECIFIC ANTIGEN: 0.86 NG/ML (ref 0–4)
RBC # BLD: 4.51 M/UL (ref 4.2–5.9)
SODIUM BLD-SCNC: 141 MMOL/L (ref 136–145)
TOTAL PROTEIN: 6.7 G/DL (ref 6.4–8.2)
TSH SERPL DL<=0.05 MIU/L-ACNC: 1.94 UIU/ML (ref 0.27–4.2)
WBC # BLD: 5.4 K/UL (ref 4–11)

## 2020-06-12 PROCEDURE — G8427 DOCREV CUR MEDS BY ELIG CLIN: HCPCS | Performed by: INTERNAL MEDICINE

## 2020-06-12 PROCEDURE — 1036F TOBACCO NON-USER: CPT | Performed by: INTERNAL MEDICINE

## 2020-06-12 PROCEDURE — G8419 CALC BMI OUT NRM PARAM NOF/U: HCPCS | Performed by: INTERNAL MEDICINE

## 2020-06-12 PROCEDURE — 99214 OFFICE O/P EST MOD 30 MIN: CPT | Performed by: INTERNAL MEDICINE

## 2020-06-12 RX ORDER — CLOPIDOGREL BISULFATE 75 MG/1
75 TABLET ORAL DAILY
Qty: 90 TABLET | Refills: 3 | Status: SHIPPED | OUTPATIENT
Start: 2020-06-12 | End: 2021-07-19

## 2020-06-12 RX ORDER — LISINOPRIL 10 MG/1
10 TABLET ORAL DAILY
Qty: 90 TABLET | Refills: 3 | Status: SHIPPED | OUTPATIENT
Start: 2020-06-12 | End: 2021-07-12

## 2020-06-12 RX ORDER — CARVEDILOL 3.12 MG/1
TABLET ORAL
Qty: 180 TABLET | Refills: 3 | Status: SHIPPED | OUTPATIENT
Start: 2020-06-12 | End: 2021-07-16

## 2020-06-12 RX ORDER — ATORVASTATIN CALCIUM 80 MG/1
80 TABLET, FILM COATED ORAL NIGHTLY
Qty: 90 TABLET | Refills: 3 | Status: SHIPPED | OUTPATIENT
Start: 2020-06-12 | End: 2021-07-12

## 2020-06-12 NOTE — PROGRESS NOTES
51 y.o. with CAD here for f/u. Had LAD/RCA CONRAD in 8/2018. HPI/Update:  Feels \"pretty good. \" Gaining weight. Not doing cardiac rehab. Ended at 2019. Not at gym d/t covid. No angina. Has some sob with exertion, doesn't feel like in great shape. No le edema. No orthopnea. Past Medical History:   Diagnosis Date    Benign prostatic hyperplasia with urinary hesitancy 9/26/2018    CAD (coronary artery disease) 08/13/2018     PCI LAD with 2.75 x 12 mm Xience [place] and Mid right PDA with 2.25x 12 mm Xience [place] CONRAD    Chronic rhinitis 3/26/2018    Depression with anxiety 10/3/2014    Essential hypertension 9/23/2016    Hypertriglyceridemia 8/10/2018    Obstructive sleep apnea syndrome     Umbilical hernia      Past Surgical History:   Procedure Laterality Date    CORONARY ANGIOPLASTY WITH STENT PLACEMENT  08/13/2018    x 2    INGUINAL HERNIA REPAIR      UMBILICAL HERNIA REPAIR       Social History     Tobacco Use    Smoking status: Never Smoker    Smokeless tobacco: Never Used   Substance Use Topics    Alcohol use: Yes     Comment: rarely    Drug use: Not on file     Allergies   Allergen Reactions    No Known Drug Allergy      Family History   Problem Relation Age of Onset    Hypertension Other      Review of Systems   General: No fevers, chills, fatigue, or night sweats. No abnormal changes in weight. HEENT: No blurry or decreased vision. No changes in hearing, nasal discharge or sore throat. Cardiovascular: See HPI. No cramping in legs or buttocks when walking. Respiratory: No cough, hemoptysis, or wheezing. No history of asthma. Gastrointestinal: No abdominal pain, hematochezia, melana, or history of GI ulcers. Genito-Urinary: No dysuria or hematuria. No urgency or polyuria. Musculoskeletal: No complaints of joint pain, joint swelling or muscular weakness/soreness. Neurological: No dizziness or headaches. No numbness/tingling, speech problems or weakness.  No history of a stroke or

## 2020-06-13 LAB
ESTIMATED AVERAGE GLUCOSE: 105.4 MG/DL
HBA1C MFR BLD: 5.3 %

## 2020-07-20 RX ORDER — TAMSULOSIN HYDROCHLORIDE 0.4 MG/1
0.4 CAPSULE ORAL DAILY
Qty: 30 CAPSULE | Refills: 5 | Status: SHIPPED | OUTPATIENT
Start: 2020-07-20 | End: 2020-11-10 | Stop reason: SDUPTHER

## 2020-07-20 NOTE — TELEPHONE ENCOUNTER
Requested Prescriptions     Pending Prescriptions Disp Refills    tamsulosin (FLOMAX) 0.4 MG capsule [Pharmacy Med Name: TAMSULOSIN 0.4MG CAPSULES] 30 capsule 5     Sig: TAKE 1 CAPSULE BY MOUTH DAILY     Last ov 2/17/20  Last labs 6/12/20

## 2020-09-02 RX ORDER — DESVENLAFAXINE 50 MG/1
50 TABLET, EXTENDED RELEASE ORAL DAILY
Qty: 30 TABLET | Refills: 5 | Status: SHIPPED | OUTPATIENT
Start: 2020-09-02 | End: 2021-03-10

## 2020-09-02 NOTE — TELEPHONE ENCOUNTER
Requested Prescriptions     Pending Prescriptions Disp Refills    desvenlafaxine succinate (PRISTIQ) 50 MG TB24 extended release tablet [Pharmacy Med Name: DESVENLAFAXINE ER SUCCINATE 50MG T] 30 tablet 5     Sig: TAKE 1 TABLET BY MOUTH DAILY     LOV:2/17/2020  David Cordial

## 2020-09-28 ENCOUNTER — TELEPHONE (OUTPATIENT)
Dept: CARDIOLOGY CLINIC | Age: 50
End: 2020-09-28

## 2020-11-10 RX ORDER — TAMSULOSIN HYDROCHLORIDE 0.4 MG/1
0.4 CAPSULE ORAL DAILY
Qty: 30 CAPSULE | Refills: 5 | Status: SHIPPED | OUTPATIENT
Start: 2020-11-10 | Stop reason: SDUPTHER

## 2020-12-07 ENCOUNTER — OFFICE VISIT (OUTPATIENT)
Dept: PULMONOLOGY | Age: 50
End: 2020-12-07
Payer: COMMERCIAL

## 2020-12-07 VITALS
BODY MASS INDEX: 28.31 KG/M2 | HEIGHT: 73 IN | WEIGHT: 213.6 LBS | HEART RATE: 82 BPM | SYSTOLIC BLOOD PRESSURE: 118 MMHG | DIASTOLIC BLOOD PRESSURE: 80 MMHG | OXYGEN SATURATION: 96 %

## 2020-12-07 PROCEDURE — G8427 DOCREV CUR MEDS BY ELIG CLIN: HCPCS | Performed by: INTERNAL MEDICINE

## 2020-12-07 PROCEDURE — 99214 OFFICE O/P EST MOD 30 MIN: CPT | Performed by: INTERNAL MEDICINE

## 2020-12-07 ASSESSMENT — SLEEP AND FATIGUE QUESTIONNAIRES
ESS TOTAL SCORE: 1
HOW LIKELY ARE YOU TO NOD OFF OR FALL ASLEEP IN A CAR, WHILE STOPPED FOR A FEW MINUTES IN TRAFFIC: 0
HOW LIKELY ARE YOU TO NOD OFF OR FALL ASLEEP WHILE LYING DOWN TO REST IN THE AFTERNOON WHEN CIRCUMSTANCES PERMIT: 0
HOW LIKELY ARE YOU TO NOD OFF OR FALL ASLEEP WHILE WATCHING TV: 0
HOW LIKELY ARE YOU TO NOD OFF OR FALL ASLEEP WHILE SITTING INACTIVE IN A PUBLIC PLACE: 0
HOW LIKELY ARE YOU TO NOD OFF OR FALL ASLEEP WHILE SITTING QUIETLY AFTER LUNCH WITHOUT ALCOHOL: 0
HOW LIKELY ARE YOU TO NOD OFF OR FALL ASLEEP WHILE SITTING AND READING: 0
HOW LIKELY ARE YOU TO NOD OFF OR FALL ASLEEP WHILE SITTING AND TALKING TO SOMEONE: 0
HOW LIKELY ARE YOU TO NOD OFF OR FALL ASLEEP WHEN YOU ARE A PASSENGER IN A CAR FOR AN HOUR WITHOUT A BREAK: 1

## 2020-12-07 NOTE — PROGRESS NOTES
Karolyn Solitario MD, [facility]  Tiffanie Kehrt CNP Moshe Thao [address]  3[address] Floor, [address] [facility], [facility] Gely Saleem E [phone]   [facility] [address] Joanna Allen. [address] [facility]. Yung Torres [address] [phone]     Video Visit- Follow up    Pursuant to the emergency declaration under the [address] [place] [address], [facility] authority and the [employer] and [employer] Act, this Virtual  Visit was conducted, with patient's consent, to reduce the patient's risk of exposure to COVID-19 and provide continuity of care for an established patient. Services were provided through a video synchronous discussion virtually to substitute for in-person clinic visit. Patient was located in their home. Assessment/Plan:     1. Obstructive sleep apnea syndrome  Assessment & Plan:  Chronic-worsening:  Continue medications per his PCP and other physicians. Instructed not to drive unless had 4 hrs of effective therapy for his BUSHRA the night before. Did review the risks of under or untreated BUSHRA including, but not limited to, higher risks of motor vehicle accidents, stroke, heart attacks, and death. He understands and accepts all these risks. Needs to use his machine each and every night. 2. Coronary artery disease involving native coronary artery of native heart with unstable angina pectoris [facility])  Assessment & Plan:  Chronic- Stable. Cont meds per PCP and other physicians. 3. Essential hypertension  Assessment & Plan:  Chronic- Stable. Cont meds per PCP and other physicians. 4. Depression with anxiety  Assessment & Plan:  Chronic- Stable. Cont meds per PCP and other physicians. Diagnoses of Obstructive sleep apnea syndrome, Coronary artery disease involving native coronary artery of native heart with unstable angina pectoris [facility]), Essential hypertension, and Depression with anxiety were pertinent to this visit.  The chronic medical conditions listed are directly related to the primary diagnosis listed above. The management of the primary diagnosis affects the secondary diagnosis and vice versa. Subjective:     Patient ID: Hiram Ahumada is a 53 y.o. male. Chief Complaint   Patient presents with    Sleep Apnea     Subjective   HPI:    Machine Modem/Download Info:  Compliance (hours/night): 2.5 hrs/night  Download AHI (/hour): 2.1 /HR  Average CPAP Pressure : 11.5 cmH2O APAP - Settings  Pressure Min: 10 cmH2O  Pressure Max: 19 cmH2O     Comfort Settings  Humidity Level (0-8): 4  Flex/EPR (0-3): 3       BUSHRA: Not consistent with use of his machine. When he can wear it does well. Still sleeping on the couch most night. Has not had supplies for awhile. Knows he has bad sleep habits. [facility]    [place] - Total score: 1    Current Outpatient Medications   Medication Sig Dispense Refill    tamsulosin (FLOMAX) 0.4 MG capsule Take 1 capsule by mouth daily 30 capsule 5    desvenlafaxine succinate (PRISTIQ) 50 MG TB24 extended release tablet TAKE 1 TABLET BY MOUTH DAILY 30 tablet 5    lisinopril (PRINIVIL;ZESTRIL) 10 MG tablet Take 1 tablet by mouth daily 90 tablet 3    clopidogrel (PLAVIX) 75 MG tablet Take 1 tablet by mouth daily 90 tablet 3    carvedilol (COREG) 3.125 MG tablet TAKE 1 TABLET BY MOUTH TWICE DAILY WITH MEALS 180 tablet 3    atorvastatin (LIPITOR) 80 MG tablet Take 1 tablet by mouth nightly 90 tablet 3    aspirin 81 MG chewable tablet Take 1 tablet by mouth daily 30 tablet 3     No current facility-administered medications for this visit.         Allergies as of 12/07/2020 - Review Complete 12/07/2020   Allergen Reaction Noted    No known drug allergy  02/24/2011         Electronically signed by Kishor Sutherland MD on 12/7/2020 at 3:35 PM

## 2020-12-07 NOTE — PROGRESS NOTES
Ghassan Screen         : 1970    Diagnosis: [x] BUSHRA (G47.33) [] CSA (G47.31) [] Apnea (G47.30)   Length of Need: [x] 13 Months [] 99 Months [] Other:    Machine (HARRY!): [] Respironics Dream Station      Auto [] ResMed AirSense     Auto [] Other:     []  CPAP () [] Bilevel ()   Mode: [] Auto [] Spontaneous    Mode: [] Auto [] Spontaneous              Comfort Settings:        Humidifier: [] Heated ()        [x] Water chamber replacement ()/ 1 per 6 months        Mask:   [] Nasal () /1 per 3 months [x] Full Face () /1 per 3 months   [] Patient choice -Size and fit mask [x] Patient Choice - Size and fit mask   [] Dispense:  [] Dispense:    [] Headgear () / 1 per 3 months [x] Headgear () / 1 per 3 months   [] Replacement Nasal Cushion ()/2 per month [x] Interface Replacement ()/1 per month   [] Replacement Nasal Pillows ()/2 per month         Tubing: [x] Heated ()/1 per 3 months    [] Standard ()/1 per 3 months [] Other:           Filters: [x] Non-disposable ()/1 per 6 months     [x] Ultra-Fine, Disposable ()/2 per month        Miscellaneous: [] Chin Strap ()/ 1 per 6 months [] O2 bleed-in:       LPM   [] Oximetry on CPAP/Bilevel []  Other:    [x] Modem: ()         Start Order Date: 20    MEDICAL JUSTIFICATION:  I, the undersigned, certify that the above prescribed supplies are medically necessary for this patients wellbeing. In my opinion, the supplies are both reasonable and necessary in reference to accepted standards of medicalpractice in treatment of this patients condition.     Rolando Herman MD      NPI: [id number]       Order Signed Date: 20    Electronically signed by Rolando Herman MD on 2020 at 3:34 PM

## 2020-12-07 NOTE — LETTER
[facility]  [address] [facility]  [place]  [address]  Phone: [phone]  Fax: [phone]    December 7, 2020       Patient: Kortney Diaz   MR Number: [id number]   YOB: 1970   Date of Visit: 12/7/2020       Kortney Diaz was seen for a follow up visit today. Here is my assessment and plan as well as an attached copy of his visit today:    Obstructive sleep apnea syndrome  Chronic-worsening:  Continue medications per his PCP and other physicians. Instructed not to drive unless had 4 hrs of effective therapy for his BUSHRA the night before. Did review the risks of under or untreated BUSHRA including, but not limited to, higher risks of motor vehicle accidents, stroke, heart attacks, and death. He understands and accepts all these risks. Needs to use his machine each and every night. Coronary artery disease involving native coronary artery of native heart with unstable angina pectoris (HCC)  Chronic- Stable. Cont meds per PCP and other physicians. Essential hypertension  Chronic- Stable. Cont meds per PCP and other physicians. Depression with anxiety  Chronic- Stable. Cont meds per PCP and other physicians. If you have questions or concerns, please do not hesitate to call me. I look forward to following Garo Even along with you.     Sincerely,    Agata Pugh MD     providers:  Kandis Garcia MD  [place] [phone] [address] [facility]

## 2021-01-02 NOTE — PLAN OF CARE
Brief Pre-Op Note/Sedation Assessment      Ladi Gautam  1970  [id number]  [id number]  8:22 AM    Planned Procedure: Cardiac Catheterization Procedure    Post Procedure Plan: Return to same level of care    Consent: I have discussed with the patient and/or the patient representative the indication, alternatives, and the possible risks and/or complications of the planned procedure and the anesthesia methods. The patient and/or patient representative appear to understand and agree to proceed. Chief Complaint: Chest Pain/Pressure      Indications for the Procedure:   CAD Presentation:  Suspected CAD  Anginal Classification within 2 weeks:  CCS III - Symptoms with everyday living activities, i.e., moderate limitation  NYHA Heart Failure Class within 2 weeks: No CHF      Anti- Anginal Meds within 2 weeks:   ANTI-ANGINAL MEDS: Yes: Beta Blockers, Aspirin and Statin (Any)      Stress or Imaging Studies Performed:  None    Vital Signs:  BP (!) 151/101   Pulse 80   Temp 98 °F (36.7 °C) (Oral)   Resp 16   Ht 6' 1\" (1.854 m)   Wt 196 lb 6.9 oz (89.1 kg)   SpO2 99%   BMI 25.92 kg/m²     Allergies:   Allergies   Allergen Reactions    No Known Drug Allergy        Past Medical History:  Past Medical History:   Diagnosis Date    Chronic rhinitis 3/26/2018    Depression with anxiety 10/3/2014    Essential hypertension 9/23/2016    Hypertriglyceridemia 8/10/2018    Obstructive sleep apnea syndrome     Umbilical hernia          Surgical History:  Past Surgical History:   Procedure Laterality Date    INGUINAL HERNIA REPAIR      UMBILICAL HERNIA REPAIR           Medications:  Current Facility-Administered Medications   Medication Dose Route Frequency Provider Last Rate Last Dose    atorvastatin (LIPITOR) tablet 40 mg  40 mg Oral Nightly Gertrudis Chilel MD        desvenlafaxine succinate (PRISTIQ) extended release tablet 25 mg  25 mg Oral Daily Benny Saha MD   25 mg at 08/13/18 0820    lisinopril
Problem: Falls - Risk of:  Goal: Absence of physical injury  Absence of physical injury   Outcome: Ongoing  Low fall risk, pt aware of needs. Problem: Pain:  Goal: Control of acute pain  Control of acute pain   Outcome: Ongoing  Tylenol given for headache , pt report of pain relief.
Problem: Falls - Risk of:  Goal: Will remain free from falls  Will remain free from falls   Outcome: Ongoing  Free from fall during today's shift. Continue to monitor.
Problem: Pain:  Goal: Control of acute pain  Control of acute pain   Outcome: Ongoing  Denies pain at this time, vitals stable, assisted to position of comfort. Problem: Cardiac Output - Decreased:  Goal: Hemodynamic stability will improve  Hemodynamic stability will improve  Outcome: Ongoing  Pt has no signs and symptoms of decreased cardiac out put, Vitals signs stable at this time. No acute distress noted, will cont to monitor.
Problem: Pain:  Goal: Pain level will decrease  Pain level will decrease   Outcome: Ongoing  Pt remained free of pain for majority of shift
UTO

## 2021-03-10 RX ORDER — DESVENLAFAXINE 50 MG/1
50 TABLET, EXTENDED RELEASE ORAL DAILY
Qty: 30 TABLET | Refills: 5 | Status: SHIPPED | OUTPATIENT
Start: 2021-03-10 | End: 2021-09-09

## 2021-04-19 ENCOUNTER — OFFICE VISIT (OUTPATIENT)
Dept: CARDIOLOGY CLINIC | Age: 51
End: 2021-04-19
Payer: COMMERCIAL

## 2021-04-19 VITALS
WEIGHT: 213.2 LBS | HEART RATE: 68 BPM | HEIGHT: 73 IN | SYSTOLIC BLOOD PRESSURE: 120 MMHG | BODY MASS INDEX: 28.26 KG/M2 | DIASTOLIC BLOOD PRESSURE: 80 MMHG

## 2021-04-19 DIAGNOSIS — R06.09 DOE (DYSPNEA ON EXERTION): ICD-10-CM

## 2021-04-19 DIAGNOSIS — G47.33 OBSTRUCTIVE SLEEP APNEA SYNDROME: Chronic | ICD-10-CM

## 2021-04-19 DIAGNOSIS — E78.2 MIXED HYPERLIPIDEMIA: ICD-10-CM

## 2021-04-19 DIAGNOSIS — I25.110 CORONARY ARTERY DISEASE INVOLVING NATIVE CORONARY ARTERY OF NATIVE HEART WITH UNSTABLE ANGINA PECTORIS (HCC): Primary | Chronic | ICD-10-CM

## 2021-04-19 DIAGNOSIS — I10 ESSENTIAL HYPERTENSION: Chronic | ICD-10-CM

## 2021-04-19 DIAGNOSIS — Z98.61 S/P PTCA (PERCUTANEOUS TRANSLUMINAL CORONARY ANGIOPLASTY): ICD-10-CM

## 2021-04-19 PROCEDURE — 1036F TOBACCO NON-USER: CPT | Performed by: INTERNAL MEDICINE

## 2021-04-19 PROCEDURE — 99214 OFFICE O/P EST MOD 30 MIN: CPT | Performed by: INTERNAL MEDICINE

## 2021-04-19 PROCEDURE — G8427 DOCREV CUR MEDS BY ELIG CLIN: HCPCS | Performed by: INTERNAL MEDICINE

## 2021-04-19 PROCEDURE — 93000 ELECTROCARDIOGRAM COMPLETE: CPT | Performed by: INTERNAL MEDICINE

## 2021-04-19 PROCEDURE — G8419 CALC BMI OUT NRM PARAM NOF/U: HCPCS | Performed by: INTERNAL MEDICINE

## 2021-04-19 NOTE — PROGRESS NOTES
53 y.o. with CAD here for f/u. Had LAD/RCA CONRAD in 8/2018. HPI/Update:  No cp/cramping. Has some DELGADO like hiking modest hikes at [employer]. Has noticed increasing DELGADO over the last few months. Not like symptoms he had prior to PCI. Not sure if something's not right or if he's just out of shape. He does not exercise. No orthopnea. No PND. No LH, dizziness, syncope. Past Medical History:   Diagnosis Date    Benign prostatic hyperplasia with urinary hesitancy 9/26/2018    CAD (coronary artery disease) 08/13/2018     PCI LAD with 2.75 x 12 mm Xience [place] and Mid right PDA with 2.25x 12 mm Xience [place] CONRAD    Chronic rhinitis 3/26/2018    Depression with anxiety 10/3/2014    Essential hypertension 9/23/2016    Hypertriglyceridemia 8/10/2018    Obstructive sleep apnea syndrome     Umbilical hernia      Past Surgical History:   Procedure Laterality Date    CORONARY ANGIOPLASTY WITH STENT PLACEMENT  08/13/2018    x 2    INGUINAL HERNIA REPAIR      UMBILICAL HERNIA REPAIR       Social History     Tobacco Use    Smoking status: Never Smoker    Smokeless tobacco: Never Used   Substance Use Topics    Alcohol use: Yes     Comment: rarely    Drug use: Not on file     Allergies   Allergen Reactions    No Known Drug Allergy      Family History   Problem Relation Age of Onset    Hypertension Other      Review of Systems   General: No fevers, chills, fatigue, or night sweats. No abnormal changes in weight. HEENT: No blurry or decreased vision. No changes in hearing, nasal discharge or sore throat. Cardiovascular: See HPI. No cramping in legs or buttocks when walking. Respiratory: No cough, hemoptysis, or wheezing. No history of asthma. Gastrointestinal: No abdominal pain, hematochezia, melana, or history of GI ulcers. Genito-Urinary: No dysuria or hematuria. No urgency or polyuria. Musculoskeletal: No complaints of joint pain, joint swelling or muscular weakness/soreness. [address] see below 12/07/2017     Lab Results   Component Value Date    LABVLDL 21 10/16/2019    LABVLDL 15 09/26/2018    LABVLDL see below 12/07/2017     No results found for: CHOLHDLRATIO    4/2021 ECG: NSR.    8/2018 Angiographic Findings:  Left Main:  Normal  Left Anterior Descending:  Proximal-mid 40% stenosis. Mid 95-99% stenosis. Diffuse mid and distal plaque with at least 40% disease. Circumflex:  OM1 OM2 are high on the LCx and very small. Very large bifurcating true OM3. Superior division with a 50% stenosis. Inferior division with a 40% stenosis. Right Coronary:  Large vessel. Right PDA with 90% stenosis  Left Ventriculogram:  Not performed; patient had recent echo. Femoral Artery:  No plaque  ##LAD stented with 2.75 x 12 mm Xience [place]; RPDA stented with a 2.25 x 12 mm [place]     8/2018 TTE: Mild LVH. EF 55%. No sig valve disease    8/2018 Nuc Stress:  1. Reversible perfusion defect involving the anteroseptal apex and anteroseptal wall consistent with stress-induced ischemia in an LAD distribution. 2. Normal left ventricular wall motion. 3. Left ventricular ejection fraction of 67%. Current Outpatient Medications:     desvenlafaxine succinate (PRISTIQ) 50 MG TB24 extended release tablet, TAKE 1 TABLET BY MOUTH DAILY, Disp: 30 tablet, Rfl: 5    tamsulosin (FLOMAX) 0.4 MG capsule, Take 1 capsule by mouth daily, Disp: 30 capsule, Rfl: 5    lisinopril (PRINIVIL;ZESTRIL) 10 MG tablet, Take 1 tablet by mouth daily, Disp: 90 tablet, Rfl: 3    clopidogrel (PLAVIX) 75 MG tablet, Take 1 tablet by mouth daily, Disp: 90 tablet, Rfl: 3    carvedilol (COREG) 3.125 MG tablet, TAKE 1 TABLET BY MOUTH TWICE DAILY WITH MEALS, Disp: 180 tablet, Rfl: 3    atorvastatin (LIPITOR) 80 MG tablet, Take 1 tablet by mouth nightly, Disp: 90 tablet, Rfl: 3    aspirin 81 MG chewable tablet, Take 1 tablet by mouth daily, Disp: 30 tablet, Rfl: 3      A/P:  53 y.o. here for f/u on cad.  Has DELGADO that may be related to exertion but noticing worse in the last several weeks. 1. Coronary artery disease involving native coronary artery of native heart with unstable angina pectoris ([address]    2. Essential hypertension    3. DELGADO (dyspnea on exertion)    4. S/P PTCA (percutaneous transluminal coronary angioplasty)    5. Mixed hyperlipidemia    6. Obstructive sleep apnea syndrome      Recs:  -Cont asa and plavix  -BB and ACE  -Atorvastatin  -Myoview    Renny Luu MD, [facility]

## 2021-06-08 ENCOUNTER — OFFICE VISIT (OUTPATIENT)
Dept: PULMONOLOGY | Age: 51
End: 2021-06-08
Payer: COMMERCIAL

## 2021-06-08 VITALS
OXYGEN SATURATION: 96 % | HEIGHT: 73 IN | WEIGHT: 212.4 LBS | SYSTOLIC BLOOD PRESSURE: 118 MMHG | BODY MASS INDEX: 28.15 KG/M2 | HEART RATE: 71 BPM | DIASTOLIC BLOOD PRESSURE: 84 MMHG

## 2021-06-08 DIAGNOSIS — I25.110 CORONARY ARTERY DISEASE INVOLVING NATIVE CORONARY ARTERY OF NATIVE HEART WITH UNSTABLE ANGINA PECTORIS (HCC): Chronic | ICD-10-CM

## 2021-06-08 DIAGNOSIS — I10 ESSENTIAL HYPERTENSION: Chronic | ICD-10-CM

## 2021-06-08 DIAGNOSIS — G47.33 OBSTRUCTIVE SLEEP APNEA SYNDROME: Chronic | ICD-10-CM

## 2021-06-08 PROCEDURE — 1036F TOBACCO NON-USER: CPT | Performed by: INTERNAL MEDICINE

## 2021-06-08 PROCEDURE — G8419 CALC BMI OUT NRM PARAM NOF/U: HCPCS | Performed by: INTERNAL MEDICINE

## 2021-06-08 PROCEDURE — 99214 OFFICE O/P EST MOD 30 MIN: CPT | Performed by: INTERNAL MEDICINE

## 2021-06-08 PROCEDURE — G8427 DOCREV CUR MEDS BY ELIG CLIN: HCPCS | Performed by: INTERNAL MEDICINE

## 2021-06-08 ASSESSMENT — SLEEP AND FATIGUE QUESTIONNAIRES
HOW LIKELY ARE YOU TO NOD OFF OR FALL ASLEEP WHILE WATCHING TV: 1
HOW LIKELY ARE YOU TO NOD OFF OR FALL ASLEEP WHILE SITTING AND READING: 1
HOW LIKELY ARE YOU TO NOD OFF OR FALL ASLEEP WHEN YOU ARE A PASSENGER IN A CAR FOR AN HOUR WITHOUT A BREAK: 1
HOW LIKELY ARE YOU TO NOD OFF OR FALL ASLEEP WHILE SITTING AND TALKING TO SOMEONE: 0
HOW LIKELY ARE YOU TO NOD OFF OR FALL ASLEEP WHILE SITTING INACTIVE IN A PUBLIC PLACE: 0
HOW LIKELY ARE YOU TO NOD OFF OR FALL ASLEEP WHILE SITTING QUIETLY AFTER LUNCH WITHOUT ALCOHOL: 0
ESS TOTAL SCORE: 5
HOW LIKELY ARE YOU TO NOD OFF OR FALL ASLEEP IN A CAR, WHILE STOPPED FOR A FEW MINUTES IN TRAFFIC: 0
HOW LIKELY ARE YOU TO NOD OFF OR FALL ASLEEP WHILE LYING DOWN TO REST IN THE AFTERNOON WHEN CIRCUMSTANCES PERMIT: 2

## 2021-06-08 NOTE — ASSESSMENT & PLAN NOTE
Chronic-Stable: Reviewed and analyzed results of physiologic download from patient's machine and reviewed with patient. Supplies and parts as needed for his machine. These are medically necessary. Limit caffeine use after 3pm. Based on the analyzed data will continue with current settings. Stable on his machine at current settings, getting benefit from the use, and having minimal side effects.     Discussed with the patient is doing much better from compliance standpoint and encouraged him to keep using it as much as possible

## 2021-06-08 NOTE — PROGRESS NOTES
listed are directly related to the primary diagnosis listed above. The management of the primary diagnosis affects the secondary diagnosis and vice versa. Subjective:   Subjective   Patient ID: Emiliano Parry is a 53 y.o. male. Chief Complaint   Patient presents with    Sleep Apnea       HPI:  Machine Modem/Download Info:  Compliance (hours/night): 3.95 hrs/night  % of nights >= 4 hrs: 63.3 %  Download AHI (/hour): 2.8 /HR  Average CPAP Pressure : 11.5 cmH2O APAP - Settings  Pressure Min: 10 cmH2O  Pressure Max: 19 cmH2O                 Comfort Settings  Humidity Level (0-8): 4  Flex/EPR (0-3): 3       Feels he is working on trying to do better with his machine to be more consistent. Compliance does reflect that but discussed with patient that he is still missing several nights which should easily bring his compliance numbers up significantly. When he wears his machine feels rested and pressures feel good. DME [facility]    [place] - Total score: 5    Social History     Socioeconomic History    Marital status:      Spouse name: Not on file    Number of children: Not on file    Years of education: Not on file    Highest education level: Not on file   Occupational History    Not on file   Tobacco Use    Smoking status: Never Smoker    Smokeless tobacco: Never Used   Vaping Use    Vaping Use: Never used   Substance and Sexual Activity    Alcohol use: Yes     Comment: rarely    Drug use: Not on file    Sexual activity: Not on file   Other Topics Concern    Not on file   Social History Narrative    Not on file     Social Determinants of Health     Financial Resource Strain:     Difficulty of Paying Living Expenses:    Food Insecurity:     Worried About Running Out of Food in the Last Year:     [address] in the Last Year:    Transportation Needs:     Lack of Transportation (Medical):      Lack of Transportation (Non-Medical):    Physical Activity:     Days of Exercise per

## 2021-06-08 NOTE — LETTER
[facility]  [id number] [facility]  [place] [address] [id number]  Phone: [phone]  Fax: [phone]    Shama Garcia MD    June 8, 2021     Hola Perez MD  [facility] [address] [id number]    Patient: Rocio Nunes   MR Number: [id number]   YOB: 1970   Date of Visit: 6/8/2021       Dear Hola Perez: Thank you for referring Rocio Nunes to me for evaluation/treatment. Below are the relevant portions of my assessment and plan of care. If you have questions, please do not hesitate to call me. I look forward to following HARLEY along with you.     Sincerely,        Shama Garcia MD

## 2021-07-10 DIAGNOSIS — I10 ESSENTIAL HYPERTENSION: ICD-10-CM

## 2021-07-12 RX ORDER — LISINOPRIL 10 MG/1
10 TABLET ORAL DAILY
Qty: 90 TABLET | Refills: 3 | Status: SHIPPED | OUTPATIENT
Start: 2021-07-12 | End: 2022-07-11

## 2021-07-12 RX ORDER — ATORVASTATIN CALCIUM 80 MG/1
TABLET, FILM COATED ORAL
Qty: 90 TABLET | Refills: 3 | Status: SHIPPED | OUTPATIENT
Start: 2021-07-12 | End: 2022-07-11

## 2021-07-16 RX ORDER — CARVEDILOL 3.12 MG/1
TABLET ORAL
Qty: 180 TABLET | Refills: 3 | Status: SHIPPED | OUTPATIENT
Start: 2021-07-16 | End: 2022-07-11

## 2021-07-19 RX ORDER — CLOPIDOGREL BISULFATE 75 MG/1
75 TABLET ORAL DAILY
Qty: 90 TABLET | Refills: 3 | Status: SHIPPED | OUTPATIENT
Start: 2021-07-19

## 2021-07-19 NOTE — TELEPHONE ENCOUNTER
Requested Prescriptions     Pending Prescriptions Disp Refills    clopidogrel (PLAVIX) 75 MG tablet [Pharmacy Med Name: CLOPIDOGREL 75MG TABLETS] 90 tablet 3     Sig: TAKE 1 TABLET BY MOUTH DAILY                  Last Office Visit: 4/19/2021     Next Office Visit: 10/18/2021

## 2021-09-01 RX ORDER — TAMSULOSIN HYDROCHLORIDE 0.4 MG/1
0.4 CAPSULE ORAL DAILY
Qty: 30 CAPSULE | Refills: 5 | Status: SHIPPED | OUTPATIENT
Start: 2021-09-01

## 2021-09-09 RX ORDER — DESVENLAFAXINE 50 MG/1
50 TABLET, EXTENDED RELEASE ORAL DAILY
Qty: 30 TABLET | Refills: 0 | Status: SHIPPED | OUTPATIENT
Start: 2021-09-09 | End: 2021-09-16

## 2021-09-09 NOTE — TELEPHONE ENCOUNTER
Requested Prescriptions     Pending Prescriptions Disp Refills    desvenlafaxine succinate (PRISTIQ) 50 MG TB24 extended release tablet [Pharmacy Med Name: DESVENLAFAXINE ER SUCCINATE 50MG T] 30 tablet 5     Sig: TAKE 1 TABLET BY MOUTH DAILY     Last ov 2/17/2020  Last labs 6/12/20

## 2021-10-18 ENCOUNTER — OFFICE VISIT (OUTPATIENT)
Dept: CARDIOLOGY CLINIC | Age: 51
End: 2021-10-18
Payer: COMMERCIAL

## 2021-10-18 VITALS
BODY MASS INDEX: 28.23 KG/M2 | DIASTOLIC BLOOD PRESSURE: 86 MMHG | HEIGHT: 73 IN | OXYGEN SATURATION: 96 % | HEART RATE: 73 BPM | SYSTOLIC BLOOD PRESSURE: 128 MMHG | WEIGHT: 213 LBS

## 2021-10-18 DIAGNOSIS — I10 ESSENTIAL HYPERTENSION: ICD-10-CM

## 2021-10-18 DIAGNOSIS — Z98.61 S/P PTCA (PERCUTANEOUS TRANSLUMINAL CORONARY ANGIOPLASTY): ICD-10-CM

## 2021-10-18 DIAGNOSIS — I25.110 CORONARY ARTERY DISEASE INVOLVING NATIVE CORONARY ARTERY OF NATIVE HEART WITH UNSTABLE ANGINA PECTORIS (HCC): Primary | ICD-10-CM

## 2021-10-18 DIAGNOSIS — R06.09 DOE (DYSPNEA ON EXERTION): ICD-10-CM

## 2021-10-18 DIAGNOSIS — E78.2 MIXED HYPERLIPIDEMIA: ICD-10-CM

## 2021-10-18 PROCEDURE — G8427 DOCREV CUR MEDS BY ELIG CLIN: HCPCS | Performed by: INTERNAL MEDICINE

## 2021-10-18 PROCEDURE — 99214 OFFICE O/P EST MOD 30 MIN: CPT | Performed by: INTERNAL MEDICINE

## 2021-10-18 PROCEDURE — G8484 FLU IMMUNIZE NO ADMIN: HCPCS | Performed by: INTERNAL MEDICINE

## 2021-10-18 PROCEDURE — G8419 CALC BMI OUT NRM PARAM NOF/U: HCPCS | Performed by: INTERNAL MEDICINE

## 2021-10-18 PROCEDURE — 1036F TOBACCO NON-USER: CPT | Performed by: INTERNAL MEDICINE

## 2021-10-18 PROCEDURE — 3017F COLORECTAL CA SCREEN DOC REV: CPT | Performed by: INTERNAL MEDICINE

## 2021-10-18 NOTE — TELEPHONE ENCOUNTER
Requested Prescriptions     Pending Prescriptions Disp Refills    desvenlafaxine succinate (PRISTIQ) 50 MG TB24 extended release tablet 30 tablet 1     Sig: Take 1 tablet by mouth daily     Last ov 2/17/2020  Last labs 6/12/20

## 2021-10-18 NOTE — PROGRESS NOTES
49 y.o. with CAD here for f/u. Had LAD/RCA CONRAD in 8/2018. HPI/Update:  Never got stress test. Had it ordered for sob, which he doesn't notice any more. Isn't exerting himself much. No sig cp. No n/v/LH/dizziness. No syncope. No LE edema. No orthopnea or PND. Past Medical History:   Diagnosis Date    Benign prostatic hyperplasia with urinary hesitancy 9/26/2018    CAD (coronary artery disease) 08/13/2018     PCI LAD with 2.75 x 12 mm Xience [place] and Mid right PDA with 2.25x 12 mm Xience [place] CONRAD    Chronic rhinitis 3/26/2018    Depression with anxiety 10/3/2014    Essential hypertension 9/23/2016    Hypertriglyceridemia 8/10/2018    Obstructive sleep apnea syndrome     Umbilical hernia      Past Surgical History:   Procedure Laterality Date    CORONARY ANGIOPLASTY WITH STENT PLACEMENT  08/13/2018    x 2    INGUINAL HERNIA REPAIR      UMBILICAL HERNIA REPAIR       Social History     Tobacco Use    Smoking status: Never Smoker    Smokeless tobacco: Never Used   Vaping Use    Vaping Use: Never used   Substance Use Topics    Alcohol use: Yes     Comment: rarely    Drug use: Not on file     Allergies   Allergen Reactions    No Known Drug Allergy      Family History   Problem Relation Age of Onset    Hypertension Other      Review of Systems   General: No fevers, chills, fatigue, or night sweats. No abnormal changes in weight. HEENT: No blurry or decreased vision. No changes in hearing, nasal discharge or sore throat. Cardiovascular: See HPI. No cramping in legs or buttocks when walking. Respiratory: No cough, hemoptysis, or wheezing. No history of asthma. Gastrointestinal: No abdominal pain, hematochezia, melana, or history of GI ulcers. Genito-Urinary: No dysuria or hematuria. No urgency or polyuria. Musculoskeletal: No complaints of joint pain, joint swelling or muscular weakness/soreness. Neurological: No dizziness or headaches.   No numbness/tingling, speech problems or weakness. No history of a stroke or TIA. Psychological: No anxiety or depression  Hematological and Lymphatic: No abnormal bleeding or bruising, blood clots, jaundice. Endocrine: No malaise/lethargy, palpitations, polydipsia/polyuria, temperature intolerance or unexpected weight changes. Skin: No rashes or non-healing ulcers. PE:  Blood pressure 128/86, pulse 73, height 6' 1\" (1.854 m), weight 213 lb (96.6 kg), SpO2 96 %. General (appearance): Well devel. No distress  Eyes: Anicteric, no distress  Neck: No JVD  Ears/Nose/Mouth/Thorat: No cyanosis  CV: RRR. No m/r/g  Respiratory:  Clear B, normal respiratory effort  GI: Abd soft. No peritoneal signs  Skin: Warm, dry. No rashes  Neuro/Psych: Alert and oriented x 3. Appropriate behavior  Ext:  No c/c.  No edema  Pulses:  2+ radial. No carotid bruits    Lab Results   Component Value Date    WBC 5.4 06/12/2020    HGB 14.7 06/12/2020    HCT 42.9 06/12/2020    MCV 95.1 06/12/2020     06/12/2020     Lab Results   Component Value Date     06/12/2020    K 4.3 06/12/2020     06/12/2020    CO2 26 06/12/2020    BUN 18 06/12/2020    CREATININE 0.9 06/12/2020    GLUCOSE 130 (H) 06/12/2020    CALCIUM 9.1 06/12/2020    PROT 6.7 06/12/2020    LABALBU 4.7 06/12/2020    BILITOT 0.5 06/12/2020    ALKPHOS 65 06/12/2020    AST 27 06/12/2020    ALT 42 (H) 06/12/2020    LABGLOM >60 06/12/2020    GFRAA >60 06/12/2020    AGRATIO 2.4 (H) 06/12/2020    GLOB 2.0 06/12/2020     Lab Results   Component Value Date    CHOL 120 10/16/2019    CHOL 94 09/26/2018    CHOL 223 (H) 12/07/2017     Lab Results   Component Value Date    TRIG 106 10/16/2019    TRIG 74 09/26/2018    TRIG 458 (H) 12/07/2017     Lab Results   Component Value Date    HDL 51 10/16/2019    HDL 43 09/26/2018    HDL 33 (L) 12/07/2017     Lab Results   Component Value Date    LDLCALC 48 10/16/2019    LDLCALC 36 09/26/2018    LDLCALC see below 12/07/2017     Lab Results   Component Value Date    LABVLDL 21 10/16/2019    LABVLDL 15 09/26/2018    LABVLDL see below 12/07/2017     No results found for: CHOLHDLRDAIO    4/2021 ECG: NSR.    8/2018 Angiographic Findings:  Left Main:  Normal  Left Anterior Descending:  Proximal-mid 40% stenosis. Mid 95-99% stenosis. Diffuse mid and distal plaque with at least 40% disease. Circumflex:  OM1 OM2 are high on the LCx and very small. Very large bifurcating true OM3. Superior division with a 50% stenosis. Inferior division with a 40% stenosis. Right Coronary:  Large vessel. Right PDA with 90% stenosis  Left Ventriculogram:  Not performed; patient had recent echo. Femoral Artery:  No plaque  ##LAD stented with 2.75 x 12 mm Xience [place]; RPDA stented with a 2.25 x 12 mm [place]     8/2018 TTE: Mild LVH. EF 55%. No sig valve disease    8/2018 Nuc Stress:  1. Reversible perfusion defect involving the anteroseptal apex and anteroseptal wall consistent with stress-induced ischemia in an LAD distribution. 2. Normal left ventricular wall motion. 3. Left ventricular ejection fraction of 67%. Current Outpatient Medications:     desvenlafaxine succinate (PRISTIQ) 50 MG TB24 extended release tablet, TAKE 1 TABLET BY MOUTH DAILY, Disp: 30 tablet, Rfl: 1    tamsulosin (FLOMAX) 0.4 MG capsule, TAKE 1 CAPSULE BY MOUTH DAILY, Disp: 30 capsule, Rfl: 5    clopidogrel (PLAVIX) 75 MG tablet, TAKE 1 TABLET BY MOUTH DAILY, Disp: 90 tablet, Rfl: 3    carvedilol (COREG) 3.125 MG tablet, TAKE 1 TABLET BY MOUTH TWICE DAILY WITH MEALS, Disp: 180 tablet, Rfl: 3    lisinopril (PRINIVIL;ZESTRIL) 10 MG tablet, TAKE 1 TABLET BY MOUTH DAILY, Disp: 90 tablet, Rfl: 3    atorvastatin (LIPITOR) 80 MG tablet, TAKE 1 TABLET BY MOUTH EVERY NIGHT, Disp: 90 tablet, Rfl: 3    aspirin 81 MG chewable tablet, Take 1 tablet by mouth daily, Disp: 30 tablet, Rfl: 3      A/P:  49 y.o. here for f/u on cad.    1. Coronary artery disease involving native coronary artery of native heart with unstable angina pectoris ([address]    2. DELGADO (dyspnea on exertion)    3. S/P PTCA (percutaneous transluminal coronary angioplasty)    4. Mixed hyperlipidemia    5. Essential hypertension      Recs:  -Cont asa and plavix  -BB and ACE  -Atorvastatin  -Get labs  -Stress test  -If all ok, f/u 6-12 mo      Renny Bhandari MD, [facility] [place]

## 2021-10-19 ENCOUNTER — PATIENT MESSAGE (OUTPATIENT)
Dept: FAMILY MEDICINE CLINIC | Age: 51
End: 2021-10-19

## 2021-10-19 RX ORDER — DESVENLAFAXINE 50 MG/1
50 TABLET, EXTENDED RELEASE ORAL DAILY
Qty: 30 TABLET | Refills: 1 | OUTPATIENT
Start: 2021-10-19

## 2021-10-19 NOTE — TELEPHONE ENCOUNTER
From: Ja Roberts  To: Tess Garvin MD  Sent: 10/19/2021 2:19 PM EDT  Subject: Prescription Question    Dr. Valeri Ocasio-    The earliest I am able to get in to you see you is November 2. Are you able to write a partial prescription on the Pristiq to get me to the appointment date? I am down to one pill and I typically have withdrawal issues when I stop these meds, \"brain zaps\" etc. I am concerned about issues if I stop taking it without weaning off.     Thanks,  Ja Roberts

## 2021-10-29 ENCOUNTER — TELEPHONE (OUTPATIENT)
Dept: DERMATOLOGY | Age: 51
End: 2021-10-29

## 2021-10-29 NOTE — TELEPHONE ENCOUNTER
Patient is scheduled 4/13 w/ an appt for skin tags. Last seen 2016. Patient is asking to be kept in mind for cancellation. One of the skin tags is on his eyelid affecting his vision. Please advise. Thank you!

## 2021-11-02 ENCOUNTER — OFFICE VISIT (OUTPATIENT)
Dept: FAMILY MEDICINE CLINIC | Age: 51
End: 2021-11-02
Payer: COMMERCIAL

## 2021-11-02 VITALS
OXYGEN SATURATION: 97 % | BODY MASS INDEX: 28.15 KG/M2 | SYSTOLIC BLOOD PRESSURE: 118 MMHG | DIASTOLIC BLOOD PRESSURE: 80 MMHG | WEIGHT: 213.4 LBS | TEMPERATURE: 97.3 F | RESPIRATION RATE: 14 BRPM | HEART RATE: 90 BPM

## 2021-11-02 DIAGNOSIS — I25.110 CORONARY ARTERY DISEASE INVOLVING NATIVE CORONARY ARTERY OF NATIVE HEART WITH UNSTABLE ANGINA PECTORIS (HCC): ICD-10-CM

## 2021-11-02 DIAGNOSIS — F41.8 DEPRESSION WITH ANXIETY: Chronic | ICD-10-CM

## 2021-11-02 DIAGNOSIS — N40.1 BENIGN PROSTATIC HYPERPLASIA WITH URINARY HESITANCY: ICD-10-CM

## 2021-11-02 DIAGNOSIS — Z23 NEED FOR SHINGLES VACCINE: ICD-10-CM

## 2021-11-02 DIAGNOSIS — E78.1 HYPERTRIGLYCERIDEMIA: Chronic | ICD-10-CM

## 2021-11-02 DIAGNOSIS — I25.110 CORONARY ARTERY DISEASE INVOLVING NATIVE CORONARY ARTERY OF NATIVE HEART WITH UNSTABLE ANGINA PECTORIS (HCC): Chronic | ICD-10-CM

## 2021-11-02 DIAGNOSIS — I10 ESSENTIAL HYPERTENSION: Primary | Chronic | ICD-10-CM

## 2021-11-02 DIAGNOSIS — R39.11 BENIGN PROSTATIC HYPERPLASIA WITH URINARY HESITANCY: ICD-10-CM

## 2021-11-02 DIAGNOSIS — Z12.11 SCREEN FOR COLON CANCER: ICD-10-CM

## 2021-11-02 LAB
A/G RATIO: 1.8 (ref 1.1–2.2)
ALBUMIN SERPL-MCNC: 4.6 G/DL (ref 3.4–5)
ALP BLD-CCNC: 63 U/L (ref 40–129)
ALT SERPL-CCNC: 49 U/L (ref 10–40)
ANION GAP SERPL CALCULATED.3IONS-SCNC: 14 MMOL/L (ref 3–16)
AST SERPL-CCNC: 32 U/L (ref 15–37)
BILIRUB SERPL-MCNC: 0.6 MG/DL (ref 0–1)
BUN BLDV-MCNC: 17 MG/DL (ref 7–20)
CALCIUM SERPL-MCNC: 9.1 MG/DL (ref 8.3–10.6)
CHLORIDE BLD-SCNC: 99 MMOL/L (ref 99–110)
CHOLESTEROL, TOTAL: 113 MG/DL (ref 0–199)
CO2: 24 MMOL/L (ref 21–32)
CREAT SERPL-MCNC: 0.9 MG/DL (ref 0.9–1.3)
GFR AFRICAN AMERICAN: >60
GFR NON-AFRICAN AMERICAN: >60
GLUCOSE BLD-MCNC: 100 MG/DL (ref 70–99)
HCT VFR BLD CALC: 45.2 % (ref 40.5–52.5)
HDLC SERPL-MCNC: 39 MG/DL (ref 40–60)
HEMOGLOBIN: 15.1 G/DL (ref 13.5–17.5)
LDL CHOLESTEROL CALCULATED: 54 MG/DL
MCH RBC QN AUTO: 31.8 PG (ref 26–34)
MCHC RBC AUTO-ENTMCNC: 33.4 G/DL (ref 31–36)
MCV RBC AUTO: 95.2 FL (ref 80–100)
PDW BLD-RTO: 12.8 % (ref 12.4–15.4)
PLATELET # BLD: 130 K/UL (ref 135–450)
PMV BLD AUTO: 11.2 FL (ref 5–10.5)
POTASSIUM SERPL-SCNC: 4.5 MMOL/L (ref 3.5–5.1)
RBC # BLD: 4.75 M/UL (ref 4.2–5.9)
SODIUM BLD-SCNC: 137 MMOL/L (ref 136–145)
TOTAL PROTEIN: 7.2 G/DL (ref 6.4–8.2)
TRIGL SERPL-MCNC: 102 MG/DL (ref 0–150)
TSH REFLEX: 2.74 UIU/ML (ref 0.27–4.2)
VLDLC SERPL CALC-MCNC: 20 MG/DL
WBC # BLD: 4.9 K/UL (ref 4–11)

## 2021-11-02 PROCEDURE — 1036F TOBACCO NON-USER: CPT | Performed by: FAMILY MEDICINE

## 2021-11-02 PROCEDURE — 99214 OFFICE O/P EST MOD 30 MIN: CPT | Performed by: FAMILY MEDICINE

## 2021-11-02 PROCEDURE — G8419 CALC BMI OUT NRM PARAM NOF/U: HCPCS | Performed by: FAMILY MEDICINE

## 2021-11-02 PROCEDURE — G8427 DOCREV CUR MEDS BY ELIG CLIN: HCPCS | Performed by: FAMILY MEDICINE

## 2021-11-02 PROCEDURE — G8484 FLU IMMUNIZE NO ADMIN: HCPCS | Performed by: FAMILY MEDICINE

## 2021-11-02 PROCEDURE — 3017F COLORECTAL CA SCREEN DOC REV: CPT | Performed by: FAMILY MEDICINE

## 2021-11-02 RX ORDER — DESVENLAFAXINE 50 MG/1
50 TABLET, EXTENDED RELEASE ORAL DAILY
Qty: 90 TABLET | Refills: 3 | Status: SHIPPED | OUTPATIENT
Start: 2021-11-02

## 2021-11-02 SDOH — ECONOMIC STABILITY: TRANSPORTATION INSECURITY
IN THE PAST 12 MONTHS, HAS LACK OF TRANSPORTATION KEPT YOU FROM MEETINGS, WORK, OR FROM GETTING THINGS NEEDED FOR DAILY LIVING?: NO

## 2021-11-02 SDOH — ECONOMIC STABILITY: FOOD INSECURITY: WITHIN THE PAST 12 MONTHS, THE FOOD YOU BOUGHT JUST DIDN'T LAST AND YOU DIDN'T HAVE MONEY TO GET MORE.: NEVER TRUE

## 2021-11-02 SDOH — ECONOMIC STABILITY: TRANSPORTATION INSECURITY
IN THE PAST 12 MONTHS, HAS THE LACK OF TRANSPORTATION KEPT YOU FROM MEDICAL APPOINTMENTS OR FROM GETTING MEDICATIONS?: NO

## 2021-11-02 SDOH — ECONOMIC STABILITY: FOOD INSECURITY: WITHIN THE PAST 12 MONTHS, YOU WORRIED THAT YOUR FOOD WOULD RUN OUT BEFORE YOU GOT MONEY TO BUY MORE.: NEVER TRUE

## 2021-11-02 ASSESSMENT — SOCIAL DETERMINANTS OF HEALTH (SDOH): HOW HARD IS IT FOR YOU TO PAY FOR THE VERY BASICS LIKE FOOD, HOUSING, MEDICAL CARE, AND HEATING?: NOT HARD AT ALL

## 2021-11-02 NOTE — PROGRESS NOTES
Here for f/u and recheck of mood, CAD. Pt states that overall things have been doing well, staying healthy despite COVID. Pt states that it has been stressful but is managing it ok. Pt has stayed healthy, and no exposure recently. 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 Pt is doing well, is not very active with exercise, lifestyle. Pt struggled when they closed [facility] cardio clinic, but has not gotten back into consistent lifestyle. Here for f/u of coronary artery disease. Pt has not had any new issues of chest pain, shortness of breath. No swelling in legs. Pt adherent to medications and denies any exertional chest pain or shortness of breath. Pt denies any bleeding. Pt did see dr. Jose Cheema and they are going to look at getting stress test due to some shortness of breath issues    Pt does have BPH sx and uses flomax prn, does not find     Except as noted above in the history of present illness, the review of systems is  negative for headache, vision changes, chest pain, shortness of breath, abdominal pain, urinary sx, bowel changes. Past medical, surgical, and social history reviewed and updated  Medications and allergies reviewed and updated        O: /80   Pulse 90   Temp 97.3 °F (36.3 °C) (Temporal)   Resp 14   Wt 213 lb 6.4 oz (96.8 kg)   SpO2 97%   BMI 28.15 kg/m²   GEN: No acute distress, cooperative, well nourished, alert. HEENT: PEERLA, EOMI , normocephalic/atraumatic, nares and oropharynx clear. Mucous membranes normal, Tympanic membranes clear bilaterally. Neck: soft, supple, no thyromegaly, mass, no Lymphadenopathy  CV: Regular rate and rhythm, no murmur, rubs, gallops. No edema. Resp: Clear to auscultation bilaterally good air entry bilaterally  No crackles, wheeze. Breathing comfortably.    Psych: mood stable, No suicidal thoughts or ideation   Ext: no clubbing, cyanosis, edema Current Outpatient Medications   Medication Sig Dispense Refill    desvenlafaxine succinate (PRISTIQ) 50 MG TB24 extended release tablet Take 1 tablet by mouth daily 90 tablet 3    clopidogrel (PLAVIX) 75 MG tablet TAKE 1 TABLET BY MOUTH DAILY 90 tablet 3    carvedilol (COREG) 3.125 MG tablet TAKE 1 TABLET BY MOUTH TWICE DAILY WITH MEALS 180 tablet 3    lisinopril (PRINIVIL;ZESTRIL) 10 MG tablet TAKE 1 TABLET BY MOUTH DAILY 90 tablet 3    atorvastatin (LIPITOR) 80 MG tablet TAKE 1 TABLET BY MOUTH EVERY NIGHT 90 tablet 3    tamsulosin (FLOMAX) 0.4 MG capsule TAKE 1 CAPSULE BY MOUTH DAILY (Patient not taking: Reported on 11/2/2021) 30 capsule 5    aspirin 81 MG chewable tablet Take 1 tablet by mouth daily 30 tablet 3     No current facility-administered medications for this visit. ASSESSMENT / PLAN:    1. Essential hypertension  blood pressure stable @ goal, controlled  Check bloodwork per cardiology and for a1c  - Hemoglobin A1C; Future    2. Coronary artery disease involving native coronary artery of native heart with unstable angina pectoris (HCC)  Stable w/o sx  Cont max medical therapy  Stress test ordered per cardiology    3. Benign prostatic hyperplasia with urinary hesitancy  - PSA, Prostatic Specific Antigen; Future    4. Screen for colon cancer  Due for colonoscopy  Refer dr. Lexie Heranndez for ellen  - Robert Vernon MD, Gastroenterology, [facility]    5. Hypertriglyceridemia  Check cmp, lipids, a1c  - Hemoglobin A1C; Future    6. Need for shingles vaccine  Pt is due for update on shingles vaccine. Pt is given information on Shingrix vaccine, need for 2 doses spaced 2-6 months apart, and that due to medicare requirements, they may need to get vaccine at pharmacy. Pt given information/prescription if appropriate. Limited stock availability also discussed. Pt will consider, ok to set up nurse visit    7.  Depression with anxiety  Stable with pristiq  Continue present management. Follow-up appointment:   Pending bloodwork results/prn    Discussed use, benefit, and side effects of all prescribed medications. Barriers to medication compliance addressed. All patient questions answered. Pt voiced understanding. When applicable, patient's outside records were reviewed through [place]. The patient has signed appropriate paperworks/consents.

## 2021-11-03 DIAGNOSIS — N40.1 BENIGN PROSTATIC HYPERPLASIA WITH URINARY HESITANCY: ICD-10-CM

## 2021-11-03 DIAGNOSIS — I10 ESSENTIAL HYPERTENSION: Chronic | ICD-10-CM

## 2021-11-03 DIAGNOSIS — E78.1 HYPERTRIGLYCERIDEMIA: Chronic | ICD-10-CM

## 2021-11-03 DIAGNOSIS — R39.11 BENIGN PROSTATIC HYPERPLASIA WITH URINARY HESITANCY: ICD-10-CM

## 2021-11-03 LAB
ESTIMATED AVERAGE GLUCOSE: 105.4 MG/DL
HBA1C MFR BLD: 5.3 %
PROSTATE SPECIFIC ANTIGEN: 0.73 NG/ML (ref 0–4)

## 2021-11-18 NOTE — TELEPHONE ENCOUNTER
Tried calling patient but there was no answer and the voicemail was full. If patient calls back can offer appointment on 12/20/21.

## 2021-12-02 ENCOUNTER — VIRTUAL VISIT (OUTPATIENT)
Dept: PULMONOLOGY | Age: 51
End: 2021-12-02
Payer: COMMERCIAL

## 2021-12-02 DIAGNOSIS — I10 ESSENTIAL HYPERTENSION: Chronic | ICD-10-CM

## 2021-12-02 DIAGNOSIS — G47.33 OBSTRUCTIVE SLEEP APNEA SYNDROME: Chronic | ICD-10-CM

## 2021-12-02 DIAGNOSIS — F41.8 DEPRESSION WITH ANXIETY: Chronic | ICD-10-CM

## 2021-12-02 DIAGNOSIS — I25.110 CORONARY ARTERY DISEASE INVOLVING NATIVE CORONARY ARTERY OF NATIVE HEART WITH UNSTABLE ANGINA PECTORIS (HCC): Chronic | ICD-10-CM

## 2021-12-02 PROCEDURE — 3017F COLORECTAL CA SCREEN DOC REV: CPT | Performed by: INTERNAL MEDICINE

## 2021-12-02 PROCEDURE — G8427 DOCREV CUR MEDS BY ELIG CLIN: HCPCS | Performed by: INTERNAL MEDICINE

## 2021-12-02 PROCEDURE — 99214 OFFICE O/P EST MOD 30 MIN: CPT | Performed by: INTERNAL MEDICINE

## 2021-12-02 ASSESSMENT — SLEEP AND FATIGUE QUESTIONNAIRES
HOW LIKELY ARE YOU TO NOD OFF OR FALL ASLEEP WHEN YOU ARE A PASSENGER IN A CAR FOR AN HOUR WITHOUT A BREAK: 1
HOW LIKELY ARE YOU TO NOD OFF OR FALL ASLEEP IN A CAR, WHILE STOPPED FOR A FEW MINUTES IN TRAFFIC: 0
ESS TOTAL SCORE: 4
HOW LIKELY ARE YOU TO NOD OFF OR FALL ASLEEP WHILE LYING DOWN TO REST IN THE AFTERNOON WHEN CIRCUMSTANCES PERMIT: 2
HOW LIKELY ARE YOU TO NOD OFF OR FALL ASLEEP WHILE SITTING QUIETLY AFTER LUNCH WITHOUT ALCOHOL: 0
HOW LIKELY ARE YOU TO NOD OFF OR FALL ASLEEP WHILE SITTING AND TALKING TO SOMEONE: 0
HOW LIKELY ARE YOU TO NOD OFF OR FALL ASLEEP WHILE WATCHING TV: 1
HOW LIKELY ARE YOU TO NOD OFF OR FALL ASLEEP WHILE SITTING INACTIVE IN A PUBLIC PLACE: 0
HOW LIKELY ARE YOU TO NOD OFF OR FALL ASLEEP WHILE SITTING AND READING: 0

## 2021-12-02 NOTE — PROGRESS NOTES
Sana Foote MD, Rigoberto Neville, [facility]  Tiffanie Kehrt CNP Larkin Júnior CNP [address]  Erich [address]iz [address] [facility], [address] [facility] [phone]   [facility] [address]  Erich Ortiz. [address] [facility]. Yung Torres [address] [phone]     Video Visit- Follow up    Pursuant to the emergency declaration under the [address] [place] [address], [facility] waiver authority and the [employer] Act, this Virtual  Visit was conducted, with patient's consent, to reduce the patient's risk of exposure to COVID-19 and provide continuity of care for an established patient. Services were provided through a video synchronous discussion virtually to substitute for in-person clinic visit. Patient was located in their home. Assessment/Plan:      1. Obstructive sleep apnea syndrome  Assessment & Plan:  Chronic-with progression/exacerbation:  Continue medications per his PCP and other physicians. Instructed not to drive unless had 4 hrs of effective therapy for his BUSHRA the night before. Did review the risks of under or untreated BUSHRA including, but not limited to, higher risks of motor vehicle accidents, stroke, heart attacks, and death. He understands and accepts all these risks. Discussed with patient today the recent recall issued by [employer] for their [employer] platform Pap machines. Reviewed that it affected a very small number of machines (0.03%) and seems to be exacerbated by using ozone disinfection or machine being exposed to a very high heat/humidity environment. Recommended the patient immediately discontinue use of any external ozone  if being used. Discussed the risk of untreated sleep apnea (including but not limited to early morbidity mortality, motor vehicle accidents, and cardiovascular issues, etc.) versus the very small risk of foam degradation with use of the machine.   Possible alternative may be to switch manufacturers for their machine but will need to see if their insurance company will allow that. 2. Coronary artery disease involving native coronary artery of native heart with unstable angina pectoris [facility])  Assessment & Plan:  Chronic- Stable. Discussed the importance of treating obstructive sleep apnea as part of the management of this disorder. Cont any meds per PCP and other physicians. 3. Essential hypertension  Assessment & Plan:  Chronic- Stable. Discussed the importance of treating obstructive sleep apnea as part of the management of this disorder. Cont any meds per PCP and other physicians. 4. Depression with anxiety  Assessment & Plan:  Chronic- Stable. Discussed the importance of treating obstructive sleep apnea as part of the management of this disorder. Cont any meds per PCP and other physicians. Reviewed, analyzed, and documented physiologic data from patient's PAP machine. This information was analyzed to assess complexity and medical decision making in regards to further testing and management. Diagnoses of Obstructive sleep apnea syndrome, Coronary artery disease involving native coronary artery of native heart with unstable angina pectoris [facility]), Essential hypertension, and Depression with anxiety were pertinent to this visit. The chronic medical conditions listed are directly related to the primary diagnosis listed above. The management of the primary diagnosis affects the secondary diagnosis and vice versa. Subjective:     Patient ID: Tan Plasencia is a 49 y.o. male.     Chief Complaint   Patient presents with    Sleep Apnea     Subjective   HPI:    Machine Modem/Download Info:  Compliance (hours/night): 3.3 hrs/night  % of nights >= 4 hrs: 48.9 %  Download AHI (/hour): 3.2 /HR  Average CPAP Pressure : 12 cmH2O   APAP - Settings  Pressure Min: 10 cmH2O  Pressure Max: 19 cmH2O                 Comfort Settings  Humidity Level (0-8): 4  Flex/EPR (0-3): 3       Nose needs to be more consistent with using his machine. When he uses it really has no issues. Pressures feel good mask fit well. He is often sleeps on the couch and start down to have a using his machine consistently.     [facility]    [place] - Total score: 4    Current Outpatient Medications   Medication Instructions    aspirin 81 mg, Oral, DAILY    atorvastatin (LIPITOR) 80 MG tablet TAKE 1 TABLET BY MOUTH EVERY NIGHT    carvedilol (COREG) 3.125 MG tablet TAKE 1 TABLET BY MOUTH TWICE DAILY WITH MEALS    clopidogrel (PLAVIX) 75 mg, Oral, DAILY    desvenlafaxine succinate (PRISTIQ) 50 mg, Oral, DAILY    lisinopril (PRINIVIL;ZESTRIL) 10 mg, Oral, DAILY    tamsulosin (FLOMAX) 0.4 mg, Oral, DAILY        Electronically signed by Vira Lewis MD on 12/2/2021 at 3:28 PM

## 2021-12-02 NOTE — LETTER
[facility]  [place] [address] [address] [address]  Phone: [phone]  Fax: [phone]    Tato Yoon MD    December 2, 2021     Ronny Lion MD  [address] [place] [address] [place] [address]    Patient: Elvis Cortez   MR Number: [id number]   YOB: 1970   Date of Visit: 12/2/2021       Dear Ronny Lion: Thank you for referring Elvis Cortez to me for evaluation/treatment. Below are the relevant portions of my assessment and plan of care. 1. Obstructive sleep apnea syndrome  Assessment & Plan:  Chronic-with progression/exacerbation:  Continue medications per his PCP and other physicians. Instructed not to drive unless had 4 hrs of effective therapy for his BUSHRA the night before. Did review the risks of under or untreated BUSHRA including, but not limited to, higher risks of motor vehicle accidents, stroke, heart attacks, and death. He understands and accepts all these risks. Discussed with patient today the recent recall issued by [employer] for their [place] platform Pap machines. Reviewed that it affected a very small number of machines (0.03%) and seems to be exacerbated by using ozone disinfection or machine being exposed to a very high heat/humidity environment. Recommended the patient immediately discontinue use of any external ozone  if being used. Discussed the risk of untreated sleep apnea (including but not limited to early morbidity mortality, motor vehicle accidents, and cardiovascular issues, etc.) versus the very small risk of foam degradation with use of the machine. Possible alternative may be to switch manufacturers for their machine but will need to see if their insurance company will allow that. 2. Coronary artery disease involving native coronary artery of native heart with unstable angina pectoris [facility])  Assessment & Plan:  Chronic- Stable.   Discussed the importance of treating obstructive sleep apnea as part of the management of this disorder. Cont any meds per PCP and other physicians. 3. Essential hypertension  Assessment & Plan:  Chronic- Stable. Discussed the importance of treating obstructive sleep apnea as part of the management of this disorder. Cont any meds per PCP and other physicians. 4. Depression with anxiety  Assessment & Plan:  Chronic- Stable. Discussed the importance of treating obstructive sleep apnea as part of the management of this disorder. Cont any meds per PCP and other physicians. Reviewed, analyzed, and documented physiologic data from patient's PAP machine. This information was analyzed to assess complexity and medical decision making in regards to further testing and management. Diagnoses of Obstructive sleep apnea syndrome, Coronary artery disease involving native coronary artery of native heart with unstable angina pectoris [facility]), Essential hypertension, and Depression with anxiety were pertinent to this visit. The chronic medical conditions listed are directly related to the primary diagnosis listed above. The management of the primary diagnosis affects the secondary diagnosis and vice versa. If you have questions, please do not hesitate to call me. I look forward to following Cherylene Due along with you.     Sincerely,      Alicia Moses MD

## 2021-12-02 NOTE — ASSESSMENT & PLAN NOTE
Chronic-with progression/exacerbation:  Continue medications per his PCP and other physicians. Instructed not to drive unless had 4 hrs of effective therapy for his BUSHRA the night before. Did review the risks of under or untreated BUSHRA including, but not limited to, higher risks of motor vehicle accidents, stroke, heart attacks, and death. He understands and accepts all these risks. Discussed with patient today the recent recall issued by [employer] for their [email or web address] platform Pap machines. Reviewed that it affected a very small number of machines (0.03%) and seems to be exacerbated by using ozone disinfection or machine being exposed to a very high heat/humidity environment. Recommended the patient immediately discontinue use of any external ozone  if being used. Discussed the risk of untreated sleep apnea (including but not limited to early morbidity mortality, motor vehicle accidents, and cardiovascular issues, etc.) versus the very small risk of foam degradation with use of the machine. Possible alternative may be to switch manufacturers for their machine but will need to see if their insurance company will allow that.

## 2022-04-13 ENCOUNTER — OFFICE VISIT (OUTPATIENT)
Dept: DERMATOLOGY | Age: 52
End: 2022-04-13
Payer: COMMERCIAL

## 2022-04-13 VITALS — TEMPERATURE: 97.2 F

## 2022-04-13 DIAGNOSIS — L91.8 CUTANEOUS SKIN TAGS: Primary | ICD-10-CM

## 2022-04-13 PROCEDURE — 11200 RMVL SKIN TAGS UP TO&INC 15: CPT | Performed by: DERMATOLOGY

## 2022-04-13 NOTE — PROGRESS NOTES
[facility]  Rita Harrison Layton  1970    49 y.o. male     Date of Visit: 4/13/2022    Chief Complaint: Skin tags    History of Present Illness:    He complains of several recurrently irritated skin tags around the eyes, right lower neck and upper back. Review of Systems:  Gen: Feels well, good sense of health. Skin: No new or changing moles. Past Medical History, Family History, Surgical History, Medications and Allergies reviewed.     Past Medical History:   Diagnosis Date    Benign prostatic hyperplasia with urinary hesitancy 9/26/2018    CAD (coronary artery disease) 08/13/2018     PCI LAD with 2.75 x 12 mm Xience [place] and Mid right PDA with 2.25x 12 mm Xience [place] CONRAD    Chronic rhinitis 3/26/2018    Depression with anxiety 10/3/2014    Essential hypertension 9/23/2016    Hypertriglyceridemia 8/10/2018    Obstructive sleep apnea syndrome     Umbilical hernia      Past Surgical History:   Procedure Laterality Date    CORONARY ANGIOPLASTY WITH STENT PLACEMENT  08/13/2018    x 2    INGUINAL HERNIA REPAIR      UMBILICAL HERNIA REPAIR         No Known Allergies  Outpatient Medications Marked as Taking for the 4/13/22 encounter (Office Visit) with Marsha Zavala MD   Medication Sig Dispense Refill    desvenlafaxine succinate (PRISTIQ) 50 MG TB24 extended release tablet Take 1 tablet by mouth daily 90 tablet 3    tamsulosin (FLOMAX) 0.4 MG capsule TAKE 1 CAPSULE BY MOUTH DAILY 30 capsule 5    clopidogrel (PLAVIX) 75 MG tablet TAKE 1 TABLET BY MOUTH DAILY 90 tablet 3    carvedilol (COREG) 3.125 MG tablet TAKE 1 TABLET BY MOUTH TWICE DAILY WITH MEALS 180 tablet 3    lisinopril (PRINIVIL;ZESTRIL) 10 MG tablet TAKE 1 TABLET BY MOUTH DAILY 90 tablet 3    atorvastatin (LIPITOR) 80 MG tablet TAKE 1 TABLET BY MOUTH EVERY NIGHT 90 tablet 3    aspirin 81 MG chewable tablet Take 1 tablet by mouth daily 30 tablet 3     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 Physical Examination     Well appearing. Left upper eyelid1, left periocular region1, right lower periocular region3, right lateral lower neck2: Multiple small pedunculated skin colored papules. Right upper back with a larger pedunculated skin colored papule. Assessment and Plan     1. Cutaneous skin tags - recurrently irritated/inflamed    2 cycles of cyrotherapy applied to 7 skin tags using a forceps dipped in liquid nitrogen. Patient was educated regarding the potential risks of erythema, crusting and discomfort. Wound care was discussed. 1 larger skin tag on the right upper back was cleansed with alcohol. Local anesthesia was achieved with 1% lidocaine with epinephrine. The skin tag was then removed with a tissue scissors and forceps. Bleeding was minimal and there were no complications.        --Tahir Sanches MD

## 2022-07-11 DIAGNOSIS — I10 ESSENTIAL HYPERTENSION: ICD-10-CM

## 2022-07-11 RX ORDER — CARVEDILOL 3.12 MG/1
TABLET ORAL
Qty: 180 TABLET | Refills: 1 | Status: SHIPPED | OUTPATIENT
Start: 2022-07-11

## 2022-07-11 RX ORDER — LISINOPRIL 10 MG/1
10 TABLET ORAL DAILY
Qty: 90 TABLET | Refills: 1 | Status: SHIPPED | OUTPATIENT
Start: 2022-07-11

## 2022-07-11 RX ORDER — ATORVASTATIN CALCIUM 80 MG/1
TABLET, FILM COATED ORAL
Qty: 90 TABLET | Refills: 1 | Status: SHIPPED | OUTPATIENT
Start: 2022-07-11

## 2022-09-09 NOTE — TELEPHONE ENCOUNTER
Tried calling patient to verify if he is following with Dr. Star Rivera at [place] or staying with [id number] [facility], unable to leave message, mailbox is full

## 2022-09-12 RX ORDER — CLOPIDOGREL BISULFATE 75 MG/1
75 TABLET ORAL DAILY
Qty: 90 TABLET | Refills: 0 | OUTPATIENT
Start: 2022-09-12

## 2022-11-13 ENCOUNTER — PATIENT MESSAGE (OUTPATIENT)
Dept: FAMILY MEDICINE CLINIC | Age: 52
End: 2022-11-13

## 2022-11-14 RX ORDER — DESVENLAFAXINE 50 MG/1
50 TABLET, EXTENDED RELEASE ORAL DAILY
Qty: 90 TABLET | Refills: 0 | Status: SHIPPED | OUTPATIENT
Start: 2022-11-14

## 2022-11-14 RX ORDER — DESVENLAFAXINE 50 MG/1
50 TABLET, EXTENDED RELEASE ORAL DAILY
Qty: 90 TABLET | Refills: 1 | Status: CANCELLED | OUTPATIENT
Start: 2022-11-14

## 2022-11-14 NOTE — TELEPHONE ENCOUNTER
From: Martinez Bellamy  To: Dr. Mandie Beadr: 2022 10:50 AM EST  Subject: Pristiq refill    Dr. Danielle Mondragon-    I am almost out of Pristiq 50mg and the bottle says \"No more refills\"    Can you please call in a new prescription on this medication? I typically fill it at [address] due to the substantially lower cost.    Thank you!     Martinez Bellamy      Last ov 2021  Last labs 21

## 2022-11-14 NOTE — TELEPHONE ENCOUNTER
Requested Prescriptions     Pending Prescriptions Disp Refills    desvenlafaxine succinate (PRISTIQ) 50 MG TB24 extended release tablet 90 tablet 1     Sig: Take 1 tablet by mouth daily        Last ov 11/2/21  Last lab 11/2/21  Next ov n/a

## 2023-01-16 DIAGNOSIS — I10 ESSENTIAL HYPERTENSION: ICD-10-CM

## 2023-01-16 RX ORDER — ATORVASTATIN CALCIUM 80 MG/1
TABLET, FILM COATED ORAL
Qty: 90 TABLET | Refills: 1 | OUTPATIENT
Start: 2023-01-16

## 2023-01-16 RX ORDER — CARVEDILOL 3.12 MG/1
TABLET ORAL
Qty: 180 TABLET | Refills: 1 | OUTPATIENT
Start: 2023-01-16

## 2023-01-16 RX ORDER — LISINOPRIL 10 MG/1
10 TABLET ORAL DAILY
Qty: 90 TABLET | Refills: 1 | OUTPATIENT
Start: 2023-01-16

## 2023-01-20 DIAGNOSIS — I10 ESSENTIAL HYPERTENSION: ICD-10-CM

## 2023-01-20 RX ORDER — LISINOPRIL 10 MG/1
10 TABLET ORAL DAILY
Qty: 90 TABLET | Refills: 1 | OUTPATIENT
Start: 2023-01-20

## 2023-01-20 RX ORDER — ATORVASTATIN CALCIUM 80 MG/1
TABLET, FILM COATED ORAL
Qty: 90 TABLET | Refills: 1 | OUTPATIENT
Start: 2023-01-20

## 2023-01-20 RX ORDER — CARVEDILOL 3.12 MG/1
TABLET ORAL
Qty: 180 TABLET | Refills: 1 | OUTPATIENT
Start: 2023-01-20

## 2023-01-30 ENCOUNTER — TELEPHONE (OUTPATIENT)
Dept: FAMILY MEDICINE CLINIC | Age: 53
End: 2023-01-30

## 2023-01-30 DIAGNOSIS — I10 ESSENTIAL HYPERTENSION: ICD-10-CM

## 2023-01-30 RX ORDER — CARVEDILOL 3.12 MG/1
TABLET ORAL
Qty: 180 TABLET | Refills: 1 | Status: SHIPPED | OUTPATIENT
Start: 2023-01-30

## 2023-01-30 RX ORDER — ATORVASTATIN CALCIUM 80 MG/1
TABLET, FILM COATED ORAL
Qty: 90 TABLET | Refills: 1 | Status: SHIPPED | OUTPATIENT
Start: 2023-01-30

## 2023-01-30 RX ORDER — LISINOPRIL 10 MG/1
10 TABLET ORAL DAILY
Qty: 90 TABLET | Refills: 1 | Status: SHIPPED | OUTPATIENT
Start: 2023-01-30

## 2023-01-30 NOTE — TELEPHONE ENCOUNTER
Requested Prescriptions     Pending Prescriptions Disp Refills    carvedilol (COREG) 3.125 MG tablet 180 tablet 1     Sig: TAKE 1 TABLET BY MOUTH TWICE DAILY WITH MEALS    lisinopril (PRINIVIL;ZESTRIL) 10 MG tablet 90 tablet 1     Sig: Take 1 tablet by mouth daily    atorvastatin (LIPITOR) 80 MG tablet 90 tablet 1     Sig: TAKE 1 TABLET BY MOUTH EVERY NIGHT     Last ov 11/2/2021  Last labs 11/2/21      Appt scheduled 2/10/23

## 2023-01-30 NOTE — TELEPHONE ENCOUNTER
Request for refills was submitted by the NP but there was no response. It appears pt was getting refills through Dr. Ramona Teixeira and his NP. I called to confirm but he no answer. Pt called earlier to request refills. ...      carvedilol (COREG) 3.125 MG tablet [[id number]]     Order Details  Dose, Route, Frequency: As Directed   Dispense Quantity: 180 tablet Refills: 1          Sig: TAKE 1 TABLET BY MOUTH TWICE DAILY WITH MEALS   lisinopril (PRINIVIL;ZESTRIL) 10 MG tablet [[id number]]     Order Details  Dose: 10 mg Route: Oral Frequency: DAILY   Dispense Quantity: 90 tablet Refills: 1          Sig: TAKE 1 TABLET BY MOUTH DAILY   atorvastatin (LIPITOR) 80 MG tablet [[id number]]     Order Details  Dose, Route, Frequency: As Directed   Dispense Quantity: 90 tablet Refills: 1          Sig: TAKE 1 TABLET BY MOUTH EVERY NIGHT         [facility] - [place] [phone] - F [phone]    11/2/21

## 2023-02-10 ENCOUNTER — OFFICE VISIT (OUTPATIENT)
Dept: FAMILY MEDICINE CLINIC | Age: 53
End: 2023-02-10
Payer: COMMERCIAL

## 2023-02-10 DIAGNOSIS — I10 ESSENTIAL HYPERTENSION: Chronic | ICD-10-CM

## 2023-02-10 DIAGNOSIS — N40.1 BENIGN PROSTATIC HYPERPLASIA WITH URINARY HESITANCY: ICD-10-CM

## 2023-02-10 DIAGNOSIS — Z12.5 SCREENING FOR PROSTATE CANCER: ICD-10-CM

## 2023-02-10 DIAGNOSIS — F41.8 DEPRESSION WITH ANXIETY: Primary | Chronic | ICD-10-CM

## 2023-02-10 DIAGNOSIS — Z23 NEED FOR SHINGLES VACCINE: ICD-10-CM

## 2023-02-10 DIAGNOSIS — R39.11 BENIGN PROSTATIC HYPERPLASIA WITH URINARY HESITANCY: ICD-10-CM

## 2023-02-10 DIAGNOSIS — G47.33 OBSTRUCTIVE SLEEP APNEA SYNDROME: Chronic | ICD-10-CM

## 2023-02-10 DIAGNOSIS — I25.110 CORONARY ARTERY DISEASE INVOLVING NATIVE CORONARY ARTERY OF NATIVE HEART WITH UNSTABLE ANGINA PECTORIS (HCC): Chronic | ICD-10-CM

## 2023-02-10 DIAGNOSIS — Z23 NEED FOR DIPHTHERIA-TETANUS-PERTUSSIS (TDAP) VACCINE: ICD-10-CM

## 2023-02-10 PROCEDURE — 3074F SYST BP LT 130 MM HG: CPT | Performed by: FAMILY MEDICINE

## 2023-02-10 PROCEDURE — G8419 CALC BMI OUT NRM PARAM NOF/U: HCPCS | Performed by: FAMILY MEDICINE

## 2023-02-10 PROCEDURE — 3079F DIAST BP 80-89 MM HG: CPT | Performed by: FAMILY MEDICINE

## 2023-02-10 PROCEDURE — 3017F COLORECTAL CA SCREEN DOC REV: CPT | Performed by: FAMILY MEDICINE

## 2023-02-10 PROCEDURE — 1036F TOBACCO NON-USER: CPT | Performed by: FAMILY MEDICINE

## 2023-02-10 PROCEDURE — G8427 DOCREV CUR MEDS BY ELIG CLIN: HCPCS | Performed by: FAMILY MEDICINE

## 2023-02-10 PROCEDURE — 99214 OFFICE O/P EST MOD 30 MIN: CPT | Performed by: FAMILY MEDICINE

## 2023-02-10 PROCEDURE — G8484 FLU IMMUNIZE NO ADMIN: HCPCS | Performed by: FAMILY MEDICINE

## 2023-02-10 RX ORDER — DESVENLAFAXINE 50 MG/1
50 TABLET, EXTENDED RELEASE ORAL DAILY
Qty: 90 TABLET | Refills: 1 | Status: SHIPPED | OUTPATIENT
Start: 2023-02-10

## 2023-02-10 RX ORDER — TAMSULOSIN HYDROCHLORIDE 0.4 MG/1
0.4 CAPSULE ORAL DAILY
Qty: 30 CAPSULE | Refills: 5 | Status: SHIPPED | OUTPATIENT
Start: 2023-02-10 | End: 2023-02-11

## 2023-02-10 SDOH — ECONOMIC STABILITY: FOOD INSECURITY: WITHIN THE PAST 12 MONTHS, YOU WORRIED THAT YOUR FOOD WOULD RUN OUT BEFORE YOU GOT MONEY TO BUY MORE.: NEVER TRUE

## 2023-02-10 SDOH — ECONOMIC STABILITY: INCOME INSECURITY: HOW HARD IS IT FOR YOU TO PAY FOR THE VERY BASICS LIKE FOOD, HOUSING, MEDICAL CARE, AND HEATING?: NOT HARD AT ALL

## 2023-02-10 SDOH — ECONOMIC STABILITY: FOOD INSECURITY: WITHIN THE PAST 12 MONTHS, THE FOOD YOU BOUGHT JUST DIDN'T LAST AND YOU DIDN'T HAVE MONEY TO GET MORE.: NEVER TRUE

## 2023-02-10 SDOH — ECONOMIC STABILITY: HOUSING INSECURITY
IN THE LAST 12 MONTHS, WAS THERE A TIME WHEN YOU DID NOT HAVE A STEADY PLACE TO SLEEP OR SLEPT IN A SHELTER (INCLUDING NOW)?: NO

## 2023-02-10 ASSESSMENT — PATIENT HEALTH QUESTIONNAIRE - PHQ9
9. THOUGHTS THAT YOU WOULD BE BETTER OFF DEAD, OR OF HURTING YOURSELF: 0
8. MOVING OR SPEAKING SO SLOWLY THAT OTHER PEOPLE COULD HAVE NOTICED. OR THE OPPOSITE, BEING SO FIGETY OR RESTLESS THAT YOU HAVE BEEN MOVING AROUND A LOT MORE THAN USUAL: 0
SUM OF ALL RESPONSES TO PHQ9 QUESTIONS 1 & 2: 0
1. LITTLE INTEREST OR PLEASURE IN DOING THINGS: 0
SUM OF ALL RESPONSES TO PHQ QUESTIONS 1-9: 0
5. POOR APPETITE OR OVEREATING: 0
SUM OF ALL RESPONSES TO PHQ QUESTIONS 1-9: 0
3. TROUBLE FALLING OR STAYING ASLEEP: 0
6. FEELING BAD ABOUT YOURSELF - OR THAT YOU ARE A FAILURE OR HAVE LET YOURSELF OR YOUR FAMILY DOWN: 0
10. IF YOU CHECKED OFF ANY PROBLEMS, HOW DIFFICULT HAVE THESE PROBLEMS MADE IT FOR YOU TO DO YOUR WORK, TAKE CARE OF THINGS AT HOME, OR GET ALONG WITH OTHER PEOPLE: 0
4. FEELING TIRED OR HAVING LITTLE ENERGY: 0
SUM OF ALL RESPONSES TO PHQ QUESTIONS 1-9: 0
7. TROUBLE CONCENTRATING ON THINGS, SUCH AS READING THE NEWSPAPER OR WATCHING TELEVISION: 0
2. FEELING DOWN, DEPRESSED OR HOPELESS: 0
SUM OF ALL RESPONSES TO PHQ QUESTIONS 1-9: 0

## 2023-02-10 NOTE — PROGRESS NOTES
Here for f/u, pt state that things are doing ok. Pt has been dealing with some stressors at work with his partner leaving to become a . Pt anticipates he will look at some options for change in space. Pt will continue to work in [employer], about 30 minutes a way. Pt states that he feels he is doing well with managing the stress. Pt remains on the pristiq and does feels that it does well. Here for f/u of coronary artery disease. Pt has not had any new issues of chest pain, shortness of breath. No swelling in legs. Pt adherent to medications and denies any exertional chest pain or shortness of breath. Pt denies any bleeding. Pt has not been as active regularly, does want to start with getting more consistent with lifestyle. Pt had stress test done that was ok 5/2022. Pt here for follow up of blood pressure. Pt states doing great with adherence to therapy and feels well. No issues of chest pain, shortness of breath. No vision changes, headache, swelling in legs. Pt does continue to use CPAP for his BUSHRA. Pt did struggle a bit with usage originally but is doing better at this time with adherence. Pt does feel that it does help. Except as noted above in the history of present illness, the review of systems is  negative for headache, vision changes, chest pain, shortness of breath, abdominal pain, urinary sx, bowel changes. Past medical, surgical, and social history reviewed and updated  Medications and allergies reviewed and updated      O: /86   Pulse 71   Ht 6' 1\" (1.854 m)   Wt 214 lb (97.1 kg)   SpO2 94%   BMI 28.23 kg/m²   GEN: No acute distress, cooperative, well nourished, alert. HEENT: PEERLA, EOMI , normocephalic/atraumatic, nares and oropharynx clear. Mucous membranes normal, Tympanic membranes clear bilaterally. Neck: soft, supple, no thyromegaly, mass, no Lymphadenopathy  CV: Regular rate and rhythm, no murmur, rubs, gallops.  No edema.  Resp: Clear to auscultation bilaterally good air entry bilaterally  No crackles, wheeze. Breathing comfortably. Psych: mood stable, No suicidal thoughts or ideation           ASSESSMENT / PLAN:    1. Depression with anxiety  Stable with pristiq  Continue present management. 2. Essential hypertension  Stable @ goal, controlled  Recheck normal  Check bloodwork as below  - CBC; Future  - Comprehensive Metabolic Panel; Future  - Lipid Panel; Future  - TSH; Future    3. Coronary artery disease involving native coronary artery of native heart with unstable angina pectoris (HCC)  Asymptomatic  Cont max medical therpay, statin and blood pressure control  Check bloodwork   - CBC; Future  - Comprehensive Metabolic Panel; Future  - Lipid Panel; Future  - TSH; Future    4. Benign prostatic hyperplasia with urinary hesitancy  - PSA, Prostatic Specific Antigen; Future    5. Obstructive sleep apnea syndrome  Cont CPAP therapy    6. Screening for prostate cancer  Check psa    7. Need for shingles vaccine  Pt is due for vaccination for shingles. Pt is given information on Shingrix vaccine, and the need for 2 doses spaced 2-6 months apart. Pt encouraged to check with insurance on coverage of vaccination and aware that if they have primary medicare coverage, that the shingles vaccination is not covered in office and they need to get done through the pharmacy. 8. Need for diphtheria-tetanus-pertussis (Tdap) vaccine  Deferring today           Follow-up appointment:   Pending bloodwork results  Prn     Discussed use, benefit, and side effects of all prescribed medications. Barriers to medication compliance addressed. All patient questions answered. Pt voiced understanding. When applicable, patient's outside records were reviewed through [place]. The patient has signed appropriate paperworks/consents.

## 2023-02-10 NOTE — TELEPHONE ENCOUNTER
Requested Prescriptions     Pending Prescriptions Disp Refills    tamsulosin (FLOMAX) 0.4 MG capsule [Pharmacy Med Name: TAMSULOSIN 0.4MG CAPSULES] 90 capsule 1     Sig: TAKE 1 CAPSULE BY MOUTH DAILY     Last ov 2/10/2023  Last labs 11/2/21

## 2023-02-11 RX ORDER — TAMSULOSIN HYDROCHLORIDE 0.4 MG/1
0.4 CAPSULE ORAL DAILY
Qty: 90 CAPSULE | Refills: 1 | Status: SHIPPED | OUTPATIENT
Start: 2023-02-11

## 2023-02-12 VITALS
OXYGEN SATURATION: 94 % | BODY MASS INDEX: 28.36 KG/M2 | SYSTOLIC BLOOD PRESSURE: 124 MMHG | WEIGHT: 214 LBS | HEIGHT: 73 IN | HEART RATE: 71 BPM | DIASTOLIC BLOOD PRESSURE: 86 MMHG

## 2023-05-11 RX ORDER — DESVENLAFAXINE SUCCINATE 50 MG/1
50 TABLET, EXTENDED RELEASE ORAL DAILY
Qty: 90 TABLET | Refills: 1 | Status: SHIPPED | OUTPATIENT
Start: 2023-05-11

## 2023-07-10 RX ORDER — DESVENLAFAXINE SUCCINATE 50 MG/1
TABLET, EXTENDED RELEASE ORAL
Qty: 30 TABLET | Refills: 5 | Status: SHIPPED | OUTPATIENT
Start: 2023-07-10

## 2023-07-23 DIAGNOSIS — I10 ESSENTIAL HYPERTENSION: ICD-10-CM

## 2023-07-23 RX ORDER — CARVEDILOL 3.12 MG/1
TABLET ORAL
Qty: 180 TABLET | Refills: 1 | Status: SHIPPED | OUTPATIENT
Start: 2023-07-23

## 2023-07-23 RX ORDER — LISINOPRIL 10 MG/1
10 TABLET ORAL DAILY
Qty: 90 TABLET | Refills: 1 | Status: SHIPPED | OUTPATIENT
Start: 2023-07-23

## 2023-08-07 RX ORDER — DESVENLAFAXINE SUCCINATE 50 MG/1
50 TABLET, EXTENDED RELEASE ORAL DAILY
Qty: 30 TABLET | Refills: 5 | Status: SHIPPED | OUTPATIENT
Start: 2023-08-07

## 2023-10-01 RX ORDER — ATORVASTATIN CALCIUM 80 MG/1
TABLET, FILM COATED ORAL
Qty: 90 TABLET | Refills: 1 | Status: SHIPPED | OUTPATIENT
Start: 2023-10-01

## 2024-01-24 DIAGNOSIS — I10 ESSENTIAL HYPERTENSION: ICD-10-CM

## 2024-01-24 RX ORDER — CARVEDILOL 3.12 MG/1
TABLET ORAL
Qty: 180 TABLET | Refills: 0 | Status: SHIPPED | OUTPATIENT
Start: 2024-01-24

## 2024-01-24 RX ORDER — LISINOPRIL 10 MG/1
10 TABLET ORAL DAILY
Qty: 90 TABLET | Refills: 0 | Status: SHIPPED | OUTPATIENT
Start: 2024-01-24

## 2024-01-24 NOTE — TELEPHONE ENCOUNTER
Requested Prescriptions     Pending Prescriptions Disp Refills    carvedilol (COREG) 3.125 MG tablet [Pharmacy Med Name: CARVEDILOL 3.125MG TABLETS] 180 tablet 1     Sig: TAKE 1 TABLET BY MOUTH TWICE DAILY WITH MEALS    lisinopril (PRINIVIL;ZESTRIL) 10 MG tablet [Pharmacy Med Name: LISINOPRIL 10MG TABLETS] 90 tablet 1     Sig: TAKE 1 TABLET BY MOUTH DAILY          Last Office Visit: 2/10/2023     Next Office Visit: Visit date not found

## 2024-03-27 RX ORDER — ATORVASTATIN CALCIUM 80 MG/1
TABLET, FILM COATED ORAL
Qty: 90 TABLET | Refills: 1 | Status: SHIPPED | OUTPATIENT
Start: 2024-03-27

## 2024-03-27 NOTE — TELEPHONE ENCOUNTER
Requested Prescriptions     Pending Prescriptions Disp Refills    atorvastatin (LIPITOR) 80 MG tablet [Pharmacy Med Name: ATORVASTATIN 80MG TABLETS] 90 tablet 1     Sig: TAKE 1 TABLET BY MOUTH EVERY NIGHT          Last Office Visit: 2/10/2023     Next Office Visit: Visit date not found     Last Labs: 4/3/23

## 2024-04-23 DIAGNOSIS — I10 ESSENTIAL HYPERTENSION: ICD-10-CM

## 2024-04-23 NOTE — TELEPHONE ENCOUNTER
Requested Prescriptions     Pending Prescriptions Disp Refills    carvedilol (COREG) 3.125 MG tablet [Pharmacy Med Name: CARVEDILOL 3.125MG TABLETS] 180 tablet 0     Sig: TAKE 1 TABLET BY MOUTH TWICE DAILY WITH MEALS    lisinopril (PRINIVIL;ZESTRIL) 10 MG tablet [Pharmacy Med Name: LISINOPRIL 10MG TABLETS] 90 tablet 0     Sig: TAKE 1 TABLET BY MOUTH DAILY          Last Office Visit: 2/10/2023     Next Office Visit: Visit date not found

## 2024-04-24 RX ORDER — CARVEDILOL 3.12 MG/1
TABLET ORAL
Qty: 180 TABLET | Refills: 0 | Status: SHIPPED | OUTPATIENT
Start: 2024-04-24

## 2024-04-24 RX ORDER — LISINOPRIL 10 MG/1
10 TABLET ORAL DAILY
Qty: 90 TABLET | Refills: 0 | Status: SHIPPED | OUTPATIENT
Start: 2024-04-24

## 2024-07-02 RX ORDER — DESVENLAFAXINE SUCCINATE 50 MG/1
50 TABLET, EXTENDED RELEASE ORAL DAILY
Qty: 30 TABLET | Refills: 5 | Status: SHIPPED | OUTPATIENT
Start: 2024-07-02

## 2024-07-19 DIAGNOSIS — I10 ESSENTIAL HYPERTENSION: ICD-10-CM

## 2024-07-21 RX ORDER — CARVEDILOL 3.12 MG/1
TABLET ORAL
Qty: 180 TABLET | Refills: 0 | Status: SHIPPED | OUTPATIENT
Start: 2024-07-21

## 2024-07-21 RX ORDER — LISINOPRIL 10 MG/1
10 TABLET ORAL DAILY
Qty: 90 TABLET | Refills: 0 | Status: SHIPPED | OUTPATIENT
Start: 2024-07-21

## 2024-09-29 RX ORDER — ATORVASTATIN CALCIUM 80 MG/1
TABLET, FILM COATED ORAL
Qty: 90 TABLET | Refills: 1 | OUTPATIENT
Start: 2024-09-29

## 2024-10-13 ASSESSMENT — PATIENT HEALTH QUESTIONNAIRE - PHQ9
9. THOUGHTS THAT YOU WOULD BE BETTER OFF DEAD, OR OF HURTING YOURSELF: NOT AT ALL
5. POOR APPETITE OR OVEREATING: NOT AT ALL
4. FEELING TIRED OR HAVING LITTLE ENERGY: SEVERAL DAYS
5. POOR APPETITE OR OVEREATING: NOT AT ALL
SUM OF ALL RESPONSES TO PHQ QUESTIONS 1-9: 1
1. LITTLE INTEREST OR PLEASURE IN DOING THINGS: NOT AT ALL
SUM OF ALL RESPONSES TO PHQ QUESTIONS 1-9: 1
6. FEELING BAD ABOUT YOURSELF - OR THAT YOU ARE A FAILURE OR HAVE LET YOURSELF OR YOUR FAMILY DOWN: NOT AT ALL
SUM OF ALL RESPONSES TO PHQ QUESTIONS 1-9: 1
7. TROUBLE CONCENTRATING ON THINGS, SUCH AS READING THE NEWSPAPER OR WATCHING TELEVISION: NOT AT ALL
10. IF YOU CHECKED OFF ANY PROBLEMS, HOW DIFFICULT HAVE THESE PROBLEMS MADE IT FOR YOU TO DO YOUR WORK, TAKE CARE OF THINGS AT HOME, OR GET ALONG WITH OTHER PEOPLE: NOT DIFFICULT AT ALL
2. FEELING DOWN, DEPRESSED OR HOPELESS: NOT AT ALL
8. MOVING OR SPEAKING SO SLOWLY THAT OTHER PEOPLE COULD HAVE NOTICED. OR THE OPPOSITE, BEING SO FIGETY OR RESTLESS THAT YOU HAVE BEEN MOVING AROUND A LOT MORE THAN USUAL: NOT AT ALL
SUM OF ALL RESPONSES TO PHQ9 QUESTIONS 1 & 2: 0
6. FEELING BAD ABOUT YOURSELF - OR THAT YOU ARE A FAILURE OR HAVE LET YOURSELF OR YOUR FAMILY DOWN: NOT AT ALL
8. MOVING OR SPEAKING SO SLOWLY THAT OTHER PEOPLE COULD HAVE NOTICED. OR THE OPPOSITE - BEING SO FIDGETY OR RESTLESS THAT YOU HAVE BEEN MOVING AROUND A LOT MORE THAN USUAL: NOT AT ALL
10. IF YOU CHECKED OFF ANY PROBLEMS, HOW DIFFICULT HAVE THESE PROBLEMS MADE IT FOR YOU TO DO YOUR WORK, TAKE CARE OF THINGS AT HOME, OR GET ALONG WITH OTHER PEOPLE: NOT DIFFICULT AT ALL
SUM OF ALL RESPONSES TO PHQ QUESTIONS 1-9: 1
3. TROUBLE FALLING OR STAYING ASLEEP: NOT AT ALL
4. FEELING TIRED OR HAVING LITTLE ENERGY: SEVERAL DAYS
SUM OF ALL RESPONSES TO PHQ QUESTIONS 1-9: 1
7. TROUBLE CONCENTRATING ON THINGS, SUCH AS READING THE NEWSPAPER OR WATCHING TELEVISION: NOT AT ALL
1. LITTLE INTEREST OR PLEASURE IN DOING THINGS: NOT AT ALL
2. FEELING DOWN, DEPRESSED OR HOPELESS: NOT AT ALL
3. TROUBLE FALLING OR STAYING ASLEEP: NOT AT ALL
9. THOUGHTS THAT YOU WOULD BE BETTER OFF DEAD, OR OF HURTING YOURSELF: NOT AT ALL

## 2024-10-14 ENCOUNTER — OFFICE VISIT (OUTPATIENT)
Dept: FAMILY MEDICINE CLINIC | Age: 54
End: 2024-10-14
Payer: COMMERCIAL

## 2024-10-14 VITALS
BODY MASS INDEX: 29.42 KG/M2 | HEIGHT: 73 IN | OXYGEN SATURATION: 96 % | HEART RATE: 81 BPM | DIASTOLIC BLOOD PRESSURE: 84 MMHG | WEIGHT: 222 LBS | SYSTOLIC BLOOD PRESSURE: 136 MMHG

## 2024-10-14 DIAGNOSIS — M25.561 CHRONIC PAIN OF RIGHT KNEE: ICD-10-CM

## 2024-10-14 DIAGNOSIS — G47.33 OBSTRUCTIVE SLEEP APNEA SYNDROME: ICD-10-CM

## 2024-10-14 DIAGNOSIS — B35.6 TINEA CRURIS: ICD-10-CM

## 2024-10-14 DIAGNOSIS — N40.1 BENIGN PROSTATIC HYPERPLASIA WITH URINARY HESITANCY: ICD-10-CM

## 2024-10-14 DIAGNOSIS — I25.110 CORONARY ARTERY DISEASE INVOLVING NATIVE CORONARY ARTERY OF NATIVE HEART WITH UNSTABLE ANGINA PECTORIS (HCC): Chronic | ICD-10-CM

## 2024-10-14 DIAGNOSIS — Z00.00 ROUTINE GENERAL MEDICAL EXAMINATION AT A HEALTH CARE FACILITY: Primary | ICD-10-CM

## 2024-10-14 DIAGNOSIS — I10 ESSENTIAL HYPERTENSION: ICD-10-CM

## 2024-10-14 DIAGNOSIS — G89.29 CHRONIC PAIN OF RIGHT KNEE: ICD-10-CM

## 2024-10-14 DIAGNOSIS — R39.11 BENIGN PROSTATIC HYPERPLASIA WITH URINARY HESITANCY: ICD-10-CM

## 2024-10-14 DIAGNOSIS — Z23 NEED FOR DIPHTHERIA-TETANUS-PERTUSSIS (TDAP) VACCINE: ICD-10-CM

## 2024-10-14 DIAGNOSIS — F41.8 DEPRESSION WITH ANXIETY: ICD-10-CM

## 2024-10-14 DIAGNOSIS — Z23 NEEDS FLU SHOT: ICD-10-CM

## 2024-10-14 PROCEDURE — 99396 PREV VISIT EST AGE 40-64: CPT | Performed by: FAMILY MEDICINE

## 2024-10-14 PROCEDURE — 3079F DIAST BP 80-89 MM HG: CPT | Performed by: FAMILY MEDICINE

## 2024-10-14 PROCEDURE — G8484 FLU IMMUNIZE NO ADMIN: HCPCS | Performed by: FAMILY MEDICINE

## 2024-10-14 PROCEDURE — 3075F SYST BP GE 130 - 139MM HG: CPT | Performed by: FAMILY MEDICINE

## 2024-10-14 RX ORDER — ATORVASTATIN CALCIUM 80 MG/1
TABLET, FILM COATED ORAL
Qty: 90 TABLET | Refills: 1 | Status: SHIPPED | OUTPATIENT
Start: 2024-10-14

## 2024-10-14 RX ORDER — CLOTRIMAZOLE AND BETAMETHASONE DIPROPIONATE 10; .64 MG/G; MG/G
CREAM TOPICAL
Qty: 45 G | Refills: 0 | Status: SHIPPED | OUTPATIENT
Start: 2024-10-14

## 2024-10-14 NOTE — PROGRESS NOTES
Here for well checkup, physical.  Pt states that things are doing well overall.      Pt feels mood is doing ok, feels managing stress very well and is not having any significant fluctuations.  Pt is in the office daily for work and that is doing well, managing the stress accordingly.  Pt remains on pristiq and feels it does well for him.      Here for f/u of cholesterol.  Pt as been working on diet/exercise and is consistent with therapy.  No side effects from current therapy.  No chest pain, shortness of breath.  No numbness/tingling in extremities     Pt does continue to follow up with Dr. Trujillo regularly for his CAD.  Pt is about 6 years out from stent placement and feels well.  Pt is trying to ride bike regularly and does walk about 1 mile every evening but states that he has noted weight gain.  Pt is not eating healthy.  Does struggle with food portions and choices.  Pt is trying to get under control.  Pt does not feel need to get on therapy but states that he can be more consistent with lifestyle.      Pt has noted some issues with R knee pain.  Pt notices with steps that he has pain.  No acute trauma and no instability.  Pt can go up and down steps.  Pt has not been stretching    Pt has noted some issues with irritation in groin, and behind scrotum.  Pt did try some topical cream but didn't seem to help.        Except as noted in the history of present illness as above, the review of systems is negative for the following:    General ROS: negative  Psychological ROS: negative  Allergy and Immunology ROS: negative  Hematological and Lymphatic ROS: negative  Respiratory ROS: no cough, shortness of breath, or wheezing  Cardiovascular ROS: no chest pain or dyspnea on exertion  Gastrointestinal ROS: no abdominal pain, change in bowel habits, or black or bloody stools  Genito-Urinary ROS: no dysuria, trouble voiding, or hematuria  Musculoskeletal ROS: negative  Dermatological ROS: negative      Past medical,

## 2024-10-18 DIAGNOSIS — I10 ESSENTIAL HYPERTENSION: ICD-10-CM

## 2024-10-18 RX ORDER — LISINOPRIL 10 MG/1
10 TABLET ORAL DAILY
Qty: 90 TABLET | Refills: 1 | Status: SHIPPED | OUTPATIENT
Start: 2024-10-18

## 2024-10-22 DIAGNOSIS — Z00.00 ROUTINE GENERAL MEDICAL EXAMINATION AT A HEALTH CARE FACILITY: ICD-10-CM

## 2024-10-22 LAB
ALBUMIN SERPL-MCNC: 4.4 G/DL (ref 3.4–5)
ALBUMIN/GLOB SERPL: 2.3 {RATIO} (ref 1.1–2.2)
ALP SERPL-CCNC: 63 U/L (ref 40–129)
ALT SERPL-CCNC: 50 U/L (ref 10–40)
ANION GAP SERPL CALCULATED.3IONS-SCNC: 10 MMOL/L (ref 3–16)
AST SERPL-CCNC: 32 U/L (ref 15–37)
BASOPHILS # BLD: 0 K/UL (ref 0–0.2)
BASOPHILS NFR BLD: 0.9 %
BILIRUB SERPL-MCNC: 0.5 MG/DL (ref 0–1)
BUN SERPL-MCNC: 17 MG/DL (ref 7–20)
CALCIUM SERPL-MCNC: 9 MG/DL (ref 8.3–10.6)
CHLORIDE SERPL-SCNC: 104 MMOL/L (ref 99–110)
CHOLEST SERPL-MCNC: 124 MG/DL (ref 0–199)
CO2 SERPL-SCNC: 27 MMOL/L (ref 21–32)
CREAT SERPL-MCNC: 1 MG/DL (ref 0.9–1.3)
DEPRECATED RDW RBC AUTO: 12.7 % (ref 12.4–15.4)
EOSINOPHIL # BLD: 0.2 K/UL (ref 0–0.6)
EOSINOPHIL NFR BLD: 3.3 %
GFR SERPLBLD CREATININE-BSD FMLA CKD-EPI: 90 ML/MIN/{1.73_M2}
GLUCOSE SERPL-MCNC: 104 MG/DL (ref 70–99)
HCT VFR BLD AUTO: 43.7 % (ref 40.5–52.5)
HDLC SERPL-MCNC: 34 MG/DL (ref 40–60)
HGB BLD-MCNC: 14.7 G/DL (ref 13.5–17.5)
LDLC SERPL CALC-MCNC: 67 MG/DL
LYMPHOCYTES # BLD: 1.4 K/UL (ref 1–5.1)
LYMPHOCYTES NFR BLD: 28.5 %
MCH RBC QN AUTO: 32.2 PG (ref 26–34)
MCHC RBC AUTO-ENTMCNC: 33.7 G/DL (ref 31–36)
MCV RBC AUTO: 95.5 FL (ref 80–100)
MONOCYTES # BLD: 0.7 K/UL (ref 0–1.3)
MONOCYTES NFR BLD: 13.1 %
NEUTROPHILS # BLD: 2.7 K/UL (ref 1.7–7.7)
NEUTROPHILS NFR BLD: 54.2 %
PLATELET # BLD AUTO: 136 K/UL (ref 135–450)
PMV BLD AUTO: 11.3 FL (ref 5–10.5)
POTASSIUM SERPL-SCNC: 4.6 MMOL/L (ref 3.5–5.1)
PROT SERPL-MCNC: 6.3 G/DL (ref 6.4–8.2)
PSA SERPL DL<=0.01 NG/ML-MCNC: 0.55 NG/ML (ref 0–4)
RBC # BLD AUTO: 4.57 M/UL (ref 4.2–5.9)
SODIUM SERPL-SCNC: 141 MMOL/L (ref 136–145)
TRIGL SERPL-MCNC: 117 MG/DL (ref 0–150)
TSH SERPL DL<=0.005 MIU/L-ACNC: 2.37 UIU/ML (ref 0.27–4.2)
VLDLC SERPL CALC-MCNC: 23 MG/DL
WBC # BLD AUTO: 5 K/UL (ref 4–11)

## 2024-10-23 LAB
EST. AVERAGE GLUCOSE BLD GHB EST-MCNC: 102.5 MG/DL
HBA1C MFR BLD: 5.2 %

## 2024-11-19 RX ORDER — CARVEDILOL 3.12 MG/1
TABLET ORAL
Qty: 180 TABLET | Refills: 3 | Status: SHIPPED | OUTPATIENT
Start: 2024-11-19

## 2024-11-19 NOTE — TELEPHONE ENCOUNTER
Medication:   Requested Prescriptions     Pending Prescriptions Disp Refills    carvedilol (COREG) 3.125 MG tablet 180 tablet 0     Sig: TAKE 1 TABLET BY MOUTH TWICE DAILY WITH MEALS     Last Filled:      Last appt: 10/14/2024   Next appt: Visit date not found    Last OARRS:       2/17/2020     3:25 PM   RX Monitoring   Periodic Controlled Substance Monitoring Possible medication side effects, risk of tolerance/dependence & alternative treatments discussed.;No signs of potential drug abuse or diversion identified.;Assessed functional status.

## 2024-12-29 RX ORDER — DESVENLAFAXINE 50 MG/1
50 TABLET, FILM COATED, EXTENDED RELEASE ORAL DAILY
Qty: 30 TABLET | Refills: 5 | Status: SHIPPED | OUTPATIENT
Start: 2024-12-29

## 2025-04-03 RX ORDER — ATORVASTATIN CALCIUM 80 MG/1
TABLET, FILM COATED ORAL
Qty: 90 TABLET | Refills: 1 | Status: SHIPPED | OUTPATIENT
Start: 2025-04-03

## 2025-04-03 NOTE — TELEPHONE ENCOUNTER
Medication:   Requested Prescriptions     Pending Prescriptions Disp Refills    atorvastatin (LIPITOR) 80 MG tablet 90 tablet 1     Sig: TAKE 1 TABLET BY MOUTH EVERY NIGHT     Last Filled:      Last appt: 10/14/2024   Next appt: Visit date not found

## 2025-04-16 DIAGNOSIS — I10 ESSENTIAL HYPERTENSION: ICD-10-CM

## 2025-04-17 RX ORDER — LISINOPRIL 10 MG/1
10 TABLET ORAL DAILY
Qty: 90 TABLET | Refills: 1 | Status: SHIPPED | OUTPATIENT
Start: 2025-04-17

## 2025-06-28 RX ORDER — DESVENLAFAXINE 50 MG/1
50 TABLET, FILM COATED, EXTENDED RELEASE ORAL DAILY
Qty: 30 TABLET | Refills: 5 | Status: SHIPPED | OUTPATIENT
Start: 2025-06-28